# Patient Record
Sex: FEMALE | Race: WHITE | Employment: UNEMPLOYED | URBAN - NONMETROPOLITAN AREA
[De-identification: names, ages, dates, MRNs, and addresses within clinical notes are randomized per-mention and may not be internally consistent; named-entity substitution may affect disease eponyms.]

---

## 2017-03-18 LAB
BASOPHILS ABSOLUTE: NORMAL /ΜL
BASOPHILS RELATIVE PERCENT: NORMAL %
BUN BLDV-MCNC: 39 MG/DL
CALCIUM SERPL-MCNC: 10.3 MG/DL
CHLORIDE BLD-SCNC: 105 MMOL/L
CO2: 28 MMOL/L
CREAT SERPL-MCNC: 1.5 MG/DL
EOSINOPHILS ABSOLUTE: NORMAL /ΜL
EOSINOPHILS RELATIVE PERCENT: NORMAL %
GFR CALCULATED: 37
GLUCOSE BLD-MCNC: 145 MG/DL
HCT VFR BLD CALC: 44 % (ref 36–46)
HEMOGLOBIN: 14.2 G/DL (ref 12–16)
LYMPHOCYTES ABSOLUTE: NORMAL /ΜL
LYMPHOCYTES RELATIVE PERCENT: NORMAL %
MCH RBC QN AUTO: NORMAL PG
MCHC RBC AUTO-ENTMCNC: NORMAL G/DL
MCV RBC AUTO: NORMAL FL
MONOCYTES ABSOLUTE: NORMAL /ΜL
MONOCYTES RELATIVE PERCENT: NORMAL %
NEUTROPHILS ABSOLUTE: NORMAL /ΜL
NEUTROPHILS RELATIVE PERCENT: NORMAL %
PLATELET # BLD: 287 K/ΜL
PMV BLD AUTO: NORMAL FL
POTASSIUM SERPL-SCNC: 4.4 MMOL/L
RBC # BLD: 4.8 10^6/ΜL
SODIUM BLD-SCNC: 141 MMOL/L
WBC # BLD: 6.18 10^3/ML

## 2017-03-28 ENCOUNTER — OFFICE VISIT (OUTPATIENT)
Dept: NEPHROLOGY | Age: 67
End: 2017-03-28

## 2017-03-28 VITALS
HEART RATE: 77 BPM | HEIGHT: 62 IN | DIASTOLIC BLOOD PRESSURE: 72 MMHG | SYSTOLIC BLOOD PRESSURE: 108 MMHG | WEIGHT: 191 LBS | OXYGEN SATURATION: 94 % | BODY MASS INDEX: 35.15 KG/M2 | RESPIRATION RATE: 18 BRPM

## 2017-03-28 DIAGNOSIS — N18.30 CKD (CHRONIC KIDNEY DISEASE) STAGE 3, GFR 30-59 ML/MIN (HCC): Primary | ICD-10-CM

## 2017-03-28 PROCEDURE — G8400 PT W/DXA NO RESULTS DOC: HCPCS | Performed by: INTERNAL MEDICINE

## 2017-03-28 PROCEDURE — 99213 OFFICE O/P EST LOW 20 MIN: CPT | Performed by: INTERNAL MEDICINE

## 2017-03-28 PROCEDURE — 1036F TOBACCO NON-USER: CPT | Performed by: INTERNAL MEDICINE

## 2017-03-28 PROCEDURE — 1090F PRES/ABSN URINE INCON ASSESS: CPT | Performed by: INTERNAL MEDICINE

## 2017-03-28 PROCEDURE — G8427 DOCREV CUR MEDS BY ELIG CLIN: HCPCS | Performed by: INTERNAL MEDICINE

## 2017-03-28 PROCEDURE — 4040F PNEUMOC VAC/ADMIN/RCVD: CPT | Performed by: INTERNAL MEDICINE

## 2017-03-28 PROCEDURE — G8484 FLU IMMUNIZE NO ADMIN: HCPCS | Performed by: INTERNAL MEDICINE

## 2017-03-28 PROCEDURE — G8598 ASA/ANTIPLAT THER USED: HCPCS | Performed by: INTERNAL MEDICINE

## 2017-03-28 PROCEDURE — G8419 CALC BMI OUT NRM PARAM NOF/U: HCPCS | Performed by: INTERNAL MEDICINE

## 2017-03-28 PROCEDURE — 3017F COLORECTAL CA SCREEN DOC REV: CPT | Performed by: INTERNAL MEDICINE

## 2017-03-28 PROCEDURE — 3014F SCREEN MAMMO DOC REV: CPT | Performed by: INTERNAL MEDICINE

## 2017-03-28 PROCEDURE — 1123F ACP DISCUSS/DSCN MKR DOCD: CPT | Performed by: INTERNAL MEDICINE

## 2017-09-20 LAB
BASOPHILS ABSOLUTE: NORMAL /ΜL
BASOPHILS RELATIVE PERCENT: NORMAL %
BUN BLDV-MCNC: 43 MG/DL
CALCIUM SERPL-MCNC: 9.6 MG/DL
CHLORIDE BLD-SCNC: 104 MMOL/L
CO2: 28 MMOL/L
CREAT SERPL-MCNC: 1.6 MG/DL
EOSINOPHILS ABSOLUTE: NORMAL /ΜL
EOSINOPHILS RELATIVE PERCENT: NORMAL %
GFR CALCULATED: 34
GLUCOSE BLD-MCNC: 231 MG/DL
HCT VFR BLD CALC: 42.7 % (ref 36–46)
HEMOGLOBIN: 14.1 G/DL (ref 12–16)
LYMPHOCYTES ABSOLUTE: NORMAL /ΜL
LYMPHOCYTES RELATIVE PERCENT: NORMAL %
MCH RBC QN AUTO: NORMAL PG
MCHC RBC AUTO-ENTMCNC: NORMAL G/DL
MCV RBC AUTO: NORMAL FL
MONOCYTES ABSOLUTE: NORMAL /ΜL
MONOCYTES RELATIVE PERCENT: NORMAL %
NEUTROPHILS ABSOLUTE: NORMAL /ΜL
NEUTROPHILS RELATIVE PERCENT: NORMAL %
PDW BLD-RTO: NORMAL %
PLATELET # BLD: 279 K/ΜL
PMV BLD AUTO: NORMAL FL
POTASSIUM SERPL-SCNC: 4.5 MMOL/L
RBC # BLD: 4.7 10^6/ΜL
SODIUM BLD-SCNC: 141 MMOL/L
WBC # BLD: 5.28 10^3/ML

## 2017-09-26 ENCOUNTER — OFFICE VISIT (OUTPATIENT)
Dept: NEPHROLOGY | Age: 67
End: 2017-09-26
Payer: MEDICARE

## 2017-09-26 VITALS
WEIGHT: 193 LBS | BODY MASS INDEX: 35.51 KG/M2 | DIASTOLIC BLOOD PRESSURE: 76 MMHG | OXYGEN SATURATION: 96 % | SYSTOLIC BLOOD PRESSURE: 128 MMHG | RESPIRATION RATE: 16 BRPM | HEART RATE: 79 BPM | HEIGHT: 62 IN

## 2017-09-26 DIAGNOSIS — N18.30 CKD (CHRONIC KIDNEY DISEASE), STAGE III (HCC): Primary | ICD-10-CM

## 2017-09-26 PROCEDURE — 1036F TOBACCO NON-USER: CPT | Performed by: INTERNAL MEDICINE

## 2017-09-26 PROCEDURE — G8400 PT W/DXA NO RESULTS DOC: HCPCS | Performed by: INTERNAL MEDICINE

## 2017-09-26 PROCEDURE — 99213 OFFICE O/P EST LOW 20 MIN: CPT | Performed by: INTERNAL MEDICINE

## 2017-09-26 PROCEDURE — 4040F PNEUMOC VAC/ADMIN/RCVD: CPT | Performed by: INTERNAL MEDICINE

## 2017-09-26 PROCEDURE — 3017F COLORECTAL CA SCREEN DOC REV: CPT | Performed by: INTERNAL MEDICINE

## 2017-09-26 PROCEDURE — 1090F PRES/ABSN URINE INCON ASSESS: CPT | Performed by: INTERNAL MEDICINE

## 2017-09-26 PROCEDURE — G8427 DOCREV CUR MEDS BY ELIG CLIN: HCPCS | Performed by: INTERNAL MEDICINE

## 2017-09-26 PROCEDURE — 3014F SCREEN MAMMO DOC REV: CPT | Performed by: INTERNAL MEDICINE

## 2017-09-26 PROCEDURE — 1123F ACP DISCUSS/DSCN MKR DOCD: CPT | Performed by: INTERNAL MEDICINE

## 2017-09-26 PROCEDURE — G8598 ASA/ANTIPLAT THER USED: HCPCS | Performed by: INTERNAL MEDICINE

## 2017-09-26 PROCEDURE — G8419 CALC BMI OUT NRM PARAM NOF/U: HCPCS | Performed by: INTERNAL MEDICINE

## 2017-09-26 RX ORDER — TRAZODONE HYDROCHLORIDE 50 MG/1
50 TABLET ORAL PRN
COMMUNITY
Start: 2017-07-10 | End: 2022-01-14

## 2018-01-19 LAB
BASOPHILS ABSOLUTE: NORMAL /ΜL
BASOPHILS RELATIVE PERCENT: NORMAL %
BUN BLDV-MCNC: 45 MG/DL
CALCIUM SERPL-MCNC: 9.8 MG/DL
CHLORIDE BLD-SCNC: 101 MMOL/L
CO2: 26 MMOL/L
CREAT SERPL-MCNC: 1.5 MG/DL
EOSINOPHILS ABSOLUTE: NORMAL /ΜL
EOSINOPHILS RELATIVE PERCENT: NORMAL %
GFR CALCULATED: 37
GLUCOSE BLD-MCNC: 153 MG/DL
HCT VFR BLD CALC: 43.4 % (ref 36–46)
HEMOGLOBIN: 14 G/DL (ref 12–16)
LYMPHOCYTES ABSOLUTE: NORMAL /ΜL
LYMPHOCYTES RELATIVE PERCENT: NORMAL %
MCH RBC QN AUTO: NORMAL PG
MCHC RBC AUTO-ENTMCNC: NORMAL G/DL
MCV RBC AUTO: NORMAL FL
MONOCYTES ABSOLUTE: NORMAL /ΜL
MONOCYTES RELATIVE PERCENT: NORMAL %
NEUTROPHILS ABSOLUTE: NORMAL /ΜL
NEUTROPHILS RELATIVE PERCENT: NORMAL %
PLATELET # BLD: 326 K/ΜL
PMV BLD AUTO: NORMAL FL
POTASSIUM SERPL-SCNC: 4.6 MMOL/L
RBC # BLD: 4.87 10^6/ΜL
SODIUM BLD-SCNC: 137 MMOL/L
WBC # BLD: 4.65 10^3/ML

## 2018-01-23 ENCOUNTER — OFFICE VISIT (OUTPATIENT)
Dept: NEPHROLOGY | Age: 68
End: 2018-01-23
Payer: MEDICARE

## 2018-01-23 VITALS
WEIGHT: 190 LBS | BODY MASS INDEX: 34.96 KG/M2 | OXYGEN SATURATION: 92 % | HEIGHT: 62 IN | DIASTOLIC BLOOD PRESSURE: 78 MMHG | RESPIRATION RATE: 18 BRPM | SYSTOLIC BLOOD PRESSURE: 130 MMHG | HEART RATE: 82 BPM

## 2018-01-23 DIAGNOSIS — N18.30 CKD (CHRONIC KIDNEY DISEASE), STAGE III (HCC): Primary | ICD-10-CM

## 2018-01-23 PROCEDURE — 3014F SCREEN MAMMO DOC REV: CPT | Performed by: INTERNAL MEDICINE

## 2018-01-23 PROCEDURE — 3017F COLORECTAL CA SCREEN DOC REV: CPT | Performed by: INTERNAL MEDICINE

## 2018-01-23 PROCEDURE — G8427 DOCREV CUR MEDS BY ELIG CLIN: HCPCS | Performed by: INTERNAL MEDICINE

## 2018-01-23 PROCEDURE — 1090F PRES/ABSN URINE INCON ASSESS: CPT | Performed by: INTERNAL MEDICINE

## 2018-01-23 PROCEDURE — G8417 CALC BMI ABV UP PARAM F/U: HCPCS | Performed by: INTERNAL MEDICINE

## 2018-01-23 PROCEDURE — 99213 OFFICE O/P EST LOW 20 MIN: CPT | Performed by: INTERNAL MEDICINE

## 2018-01-23 PROCEDURE — G8484 FLU IMMUNIZE NO ADMIN: HCPCS | Performed by: INTERNAL MEDICINE

## 2018-01-23 PROCEDURE — 1123F ACP DISCUSS/DSCN MKR DOCD: CPT | Performed by: INTERNAL MEDICINE

## 2018-01-23 PROCEDURE — G8598 ASA/ANTIPLAT THER USED: HCPCS | Performed by: INTERNAL MEDICINE

## 2018-01-23 PROCEDURE — 4040F PNEUMOC VAC/ADMIN/RCVD: CPT | Performed by: INTERNAL MEDICINE

## 2018-01-23 PROCEDURE — 1036F TOBACCO NON-USER: CPT | Performed by: INTERNAL MEDICINE

## 2018-01-23 PROCEDURE — G8400 PT W/DXA NO RESULTS DOC: HCPCS | Performed by: INTERNAL MEDICINE

## 2018-01-23 RX ORDER — FLUOXETINE HYDROCHLORIDE 20 MG/1
20 CAPSULE ORAL DAILY
COMMUNITY
Start: 2017-12-29 | End: 2018-05-22 | Stop reason: ALTCHOICE

## 2018-01-23 NOTE — PROGRESS NOTES
Take 1 tablet by mouth daily.  ranitidine (ZANTAC) 150 MG capsule Take 150 mg by mouth 2 times daily.  levothyroxine (SYNTHROID) 50 MCG tablet Take 50 mcg by mouth Daily.  aspirin 81 MG tablet Take 81 mg by mouth daily.  busPIRone (BUSPAR) 10 MG tablet 10 mg daily       No current facility-administered medications for this visit. IMPRESSIONS:      1. Chronic kidney disease stage IIIB from DM with improved kidney function. PLAN:  1. We discussed the eGFR today. 2. We will continue all current medications without changes. 3. We will see the patient back in 4 months.               _________________________________  Ralph Eduardo.  Jealyn Gomez DO  [facility]      CC  Yogesh Castillo NP

## 2018-05-12 LAB
ALBUMIN SERPL-MCNC: 4 G/DL
ALP BLD-CCNC: 67 U/L
ALT SERPL-CCNC: 30 U/L
ANION GAP SERPL CALCULATED.3IONS-SCNC: 15 MMOL/L
AST SERPL-CCNC: 19 U/L
BASOPHILS ABSOLUTE: NORMAL /ΜL
BASOPHILS RELATIVE PERCENT: NORMAL %
BILIRUB SERPL-MCNC: 0.2 MG/DL (ref 0.1–1.4)
BUN BLDV-MCNC: 52 MG/DL
CALCIUM SERPL-MCNC: 9.6 MG/DL
CHLORIDE BLD-SCNC: 103 MMOL/L
CHOLESTEROL, TOTAL: 215 MG/DL
CHOLESTEROL/HDL RATIO: ABNORMAL
CO2: 26 MMOL/L
CREAT SERPL-MCNC: 1.5 MG/DL
EOSINOPHILS ABSOLUTE: NORMAL /ΜL
EOSINOPHILS RELATIVE PERCENT: NORMAL %
GFR CALCULATED: 37
GLUCOSE BLD-MCNC: 171 MG/DL
HCT VFR BLD CALC: 44.4 % (ref 36–46)
HDLC SERPL-MCNC: 23 MG/DL (ref 35–70)
HEMOGLOBIN: 14.6 G/DL (ref 12–16)
LDL CHOLESTEROL CALCULATED: 114 MG/DL (ref 0–160)
LYMPHOCYTES ABSOLUTE: NORMAL /ΜL
LYMPHOCYTES RELATIVE PERCENT: NORMAL %
MAGNESIUM: 1.7 MG/DL
MCH RBC QN AUTO: NORMAL PG
MCHC RBC AUTO-ENTMCNC: NORMAL G/DL
MCV RBC AUTO: NORMAL FL
MONOCYTES ABSOLUTE: NORMAL /ΜL
MONOCYTES RELATIVE PERCENT: NORMAL %
NEUTROPHILS ABSOLUTE: NORMAL /ΜL
NEUTROPHILS RELATIVE PERCENT: NORMAL %
PLATELET # BLD: 285 K/ΜL
PMV BLD AUTO: NORMAL FL
POTASSIUM SERPL-SCNC: 4.4 MMOL/L
RBC # BLD: 4.95 10^6/ΜL
SODIUM BLD-SCNC: 140 MMOL/L
TOTAL PROTEIN: 8
TRIGL SERPL-MCNC: 392 MG/DL
VLDLC SERPL CALC-MCNC: 78 MG/DL
WBC # BLD: 5.24 10^3/ML

## 2018-05-22 ENCOUNTER — OFFICE VISIT (OUTPATIENT)
Dept: NEPHROLOGY | Age: 68
End: 2018-05-22
Payer: MEDICARE

## 2018-05-22 VITALS
SYSTOLIC BLOOD PRESSURE: 122 MMHG | HEIGHT: 62 IN | BODY MASS INDEX: 35.88 KG/M2 | HEART RATE: 78 BPM | DIASTOLIC BLOOD PRESSURE: 82 MMHG | RESPIRATION RATE: 18 BRPM | WEIGHT: 195 LBS | OXYGEN SATURATION: 95 %

## 2018-05-22 DIAGNOSIS — N18.30 CKD (CHRONIC KIDNEY DISEASE), STAGE III (HCC): Primary | ICD-10-CM

## 2018-05-22 PROCEDURE — 1123F ACP DISCUSS/DSCN MKR DOCD: CPT | Performed by: INTERNAL MEDICINE

## 2018-05-22 PROCEDURE — G8400 PT W/DXA NO RESULTS DOC: HCPCS | Performed by: INTERNAL MEDICINE

## 2018-05-22 PROCEDURE — G8427 DOCREV CUR MEDS BY ELIG CLIN: HCPCS | Performed by: INTERNAL MEDICINE

## 2018-05-22 PROCEDURE — G8598 ASA/ANTIPLAT THER USED: HCPCS | Performed by: INTERNAL MEDICINE

## 2018-05-22 PROCEDURE — 3017F COLORECTAL CA SCREEN DOC REV: CPT | Performed by: INTERNAL MEDICINE

## 2018-05-22 PROCEDURE — 99213 OFFICE O/P EST LOW 20 MIN: CPT | Performed by: INTERNAL MEDICINE

## 2018-05-22 PROCEDURE — 1036F TOBACCO NON-USER: CPT | Performed by: INTERNAL MEDICINE

## 2018-05-22 PROCEDURE — 1090F PRES/ABSN URINE INCON ASSESS: CPT | Performed by: INTERNAL MEDICINE

## 2018-05-22 PROCEDURE — G8417 CALC BMI ABV UP PARAM F/U: HCPCS | Performed by: INTERNAL MEDICINE

## 2018-05-22 PROCEDURE — 4040F PNEUMOC VAC/ADMIN/RCVD: CPT | Performed by: INTERNAL MEDICINE

## 2019-01-08 LAB
ALBUMIN SERPL-MCNC: 3.7 G/DL
ALP BLD-CCNC: 59 U/L
ALT SERPL-CCNC: 17 U/L
ANION GAP SERPL CALCULATED.3IONS-SCNC: 15 MMOL/L
AST SERPL-CCNC: 27 U/L
AVERAGE GLUCOSE: NORMAL
BASOPHILS ABSOLUTE: ABNORMAL /ΜL
BASOPHILS RELATIVE PERCENT: ABNORMAL %
BILIRUB SERPL-MCNC: 0.3 MG/DL (ref 0.1–1.4)
BUN BLDV-MCNC: 34 MG/DL
CALCIUM SERPL-MCNC: 8.8 MG/DL
CHLORIDE BLD-SCNC: 101 MMOL/L
CHOLESTEROL, TOTAL: 201 MG/DL
CHOLESTEROL/HDL RATIO: ABNORMAL
CO2: 25 MMOL/L
CREAT SERPL-MCNC: 1.4 MG/DL
CREATININE, URINE: 87.8
EOSINOPHILS ABSOLUTE: ABNORMAL /ΜL
EOSINOPHILS RELATIVE PERCENT: ABNORMAL %
GFR CALCULATED: 40
GLUCOSE BLD-MCNC: 222 MG/DL
HBA1C MFR BLD: 7.7 %
HCT VFR BLD CALC: 43.7 % (ref 36–46)
HDLC SERPL-MCNC: 18 MG/DL (ref 35–70)
HEMOGLOBIN: 14.4 G/DL (ref 12–16)
LDL CHOLESTEROL CALCULATED: 41 MG/DL (ref 0–160)
LYMPHOCYTES ABSOLUTE: ABNORMAL /ΜL
LYMPHOCYTES RELATIVE PERCENT: ABNORMAL %
MCH RBC QN AUTO: ABNORMAL PG
MCHC RBC AUTO-ENTMCNC: ABNORMAL G/DL
MCV RBC AUTO: ABNORMAL FL
MICROALBUMIN/CREAT 24H UR: 274.9 MG/G{CREAT}
MICROALBUMIN/CREAT UR-RTO: 313.1
MONOCYTES ABSOLUTE: ABNORMAL /ΜL
MONOCYTES RELATIVE PERCENT: ABNORMAL %
NEUTROPHILS ABSOLUTE: ABNORMAL /ΜL
NEUTROPHILS RELATIVE PERCENT: ABNORMAL %
PLATELET # BLD: 286 K/ΜL
PMV BLD AUTO: ABNORMAL FL
POTASSIUM SERPL-SCNC: 4.2 MMOL/L
RBC # BLD: 4.77 10^6/ΜL
SODIUM BLD-SCNC: 137 MMOL/L
TOTAL PROTEIN: 7.5
TRIGL SERPL-MCNC: 711 MG/DL
VLDLC SERPL CALC-MCNC: 142 MG/DL
WBC # BLD: 4.22 10^3/ML

## 2019-01-18 ENCOUNTER — OFFICE VISIT (OUTPATIENT)
Dept: NEPHROLOGY | Age: 69
End: 2019-01-18
Payer: MEDICARE

## 2019-01-18 VITALS
OXYGEN SATURATION: 98 % | RESPIRATION RATE: 18 BRPM | BODY MASS INDEX: 37.36 KG/M2 | SYSTOLIC BLOOD PRESSURE: 112 MMHG | HEART RATE: 75 BPM | WEIGHT: 203 LBS | HEIGHT: 62 IN | DIASTOLIC BLOOD PRESSURE: 64 MMHG

## 2019-01-18 DIAGNOSIS — N18.30 CKD (CHRONIC KIDNEY DISEASE), STAGE III (HCC): Primary | ICD-10-CM

## 2019-01-18 PROCEDURE — G8417 CALC BMI ABV UP PARAM F/U: HCPCS | Performed by: INTERNAL MEDICINE

## 2019-01-18 PROCEDURE — 4040F PNEUMOC VAC/ADMIN/RCVD: CPT | Performed by: INTERNAL MEDICINE

## 2019-01-18 PROCEDURE — 1123F ACP DISCUSS/DSCN MKR DOCD: CPT | Performed by: INTERNAL MEDICINE

## 2019-01-18 PROCEDURE — G8427 DOCREV CUR MEDS BY ELIG CLIN: HCPCS | Performed by: INTERNAL MEDICINE

## 2019-01-18 PROCEDURE — G8598 ASA/ANTIPLAT THER USED: HCPCS | Performed by: INTERNAL MEDICINE

## 2019-01-18 PROCEDURE — G8400 PT W/DXA NO RESULTS DOC: HCPCS | Performed by: INTERNAL MEDICINE

## 2019-01-18 PROCEDURE — G8484 FLU IMMUNIZE NO ADMIN: HCPCS | Performed by: INTERNAL MEDICINE

## 2019-01-18 PROCEDURE — 99213 OFFICE O/P EST LOW 20 MIN: CPT | Performed by: INTERNAL MEDICINE

## 2019-01-18 PROCEDURE — 1101F PT FALLS ASSESS-DOCD LE1/YR: CPT | Performed by: INTERNAL MEDICINE

## 2019-01-18 PROCEDURE — 1036F TOBACCO NON-USER: CPT | Performed by: INTERNAL MEDICINE

## 2019-01-18 PROCEDURE — 1090F PRES/ABSN URINE INCON ASSESS: CPT | Performed by: INTERNAL MEDICINE

## 2019-01-18 PROCEDURE — 3017F COLORECTAL CA SCREEN DOC REV: CPT | Performed by: INTERNAL MEDICINE

## 2019-01-18 RX ORDER — IRBESARTAN 150 MG/1
150 TABLET ORAL DAILY
COMMUNITY
Start: 2019-01-10

## 2019-01-18 RX ORDER — PAROXETINE HYDROCHLORIDE 20 MG/1
20 TABLET, FILM COATED ORAL DAILY
COMMUNITY
Start: 2019-01-10 | End: 2022-04-29

## 2019-01-18 RX ORDER — HUMAN INSULIN 100 [IU]/ML
INJECTION, SOLUTION SUBCUTANEOUS
COMMUNITY
Start: 2019-01-10 | End: 2019-09-06 | Stop reason: ALTCHOICE

## 2019-09-03 LAB
BASOPHILS ABSOLUTE: NORMAL /ΜL
BASOPHILS RELATIVE PERCENT: NORMAL %
BUN BLDV-MCNC: 24 MG/DL
CALCIUM SERPL-MCNC: 9.4 MG/DL
CHLORIDE BLD-SCNC: 101 MMOL/L
CO2: 29 MMOL/L
CREAT SERPL-MCNC: 1.2 MG/DL
EOSINOPHILS ABSOLUTE: NORMAL /ΜL
EOSINOPHILS RELATIVE PERCENT: NORMAL %
GFR CALCULATED: 48
GLUCOSE BLD-MCNC: 123 MG/DL
HCT VFR BLD CALC: 45.5 % (ref 36–46)
HEMOGLOBIN: 15.3 G/DL (ref 12–16)
LYMPHOCYTES ABSOLUTE: NORMAL /ΜL
LYMPHOCYTES RELATIVE PERCENT: NORMAL %
MCH RBC QN AUTO: NORMAL PG
MCHC RBC AUTO-ENTMCNC: NORMAL G/DL
MCV RBC AUTO: NORMAL FL
MONOCYTES ABSOLUTE: NORMAL /ΜL
MONOCYTES RELATIVE PERCENT: NORMAL %
NEUTROPHILS ABSOLUTE: NORMAL /ΜL
NEUTROPHILS RELATIVE PERCENT: NORMAL %
PLATELET # BLD: 294 K/ΜL
PMV BLD AUTO: NORMAL FL
POTASSIUM SERPL-SCNC: 3.9 MMOL/L
RBC # BLD: 5.2 10^6/ΜL
SODIUM BLD-SCNC: 139 MMOL/L
WBC # BLD: 5.21 10^3/ML

## 2019-09-06 ENCOUNTER — OFFICE VISIT (OUTPATIENT)
Dept: NEPHROLOGY | Age: 69
End: 2019-09-06
Payer: MEDICARE

## 2019-09-06 VITALS
DIASTOLIC BLOOD PRESSURE: 68 MMHG | BODY MASS INDEX: 37.57 KG/M2 | WEIGHT: 199 LBS | RESPIRATION RATE: 18 BRPM | HEIGHT: 61 IN | OXYGEN SATURATION: 94 % | HEART RATE: 75 BPM | SYSTOLIC BLOOD PRESSURE: 118 MMHG

## 2019-09-06 DIAGNOSIS — N18.30 CKD (CHRONIC KIDNEY DISEASE), STAGE III (HCC): Primary | ICD-10-CM

## 2019-09-06 PROCEDURE — 3017F COLORECTAL CA SCREEN DOC REV: CPT | Performed by: INTERNAL MEDICINE

## 2019-09-06 PROCEDURE — 4040F PNEUMOC VAC/ADMIN/RCVD: CPT | Performed by: INTERNAL MEDICINE

## 2019-09-06 PROCEDURE — 99213 OFFICE O/P EST LOW 20 MIN: CPT | Performed by: INTERNAL MEDICINE

## 2019-09-06 PROCEDURE — 1090F PRES/ABSN URINE INCON ASSESS: CPT | Performed by: INTERNAL MEDICINE

## 2019-09-06 PROCEDURE — G8427 DOCREV CUR MEDS BY ELIG CLIN: HCPCS | Performed by: INTERNAL MEDICINE

## 2019-09-06 PROCEDURE — G8598 ASA/ANTIPLAT THER USED: HCPCS | Performed by: INTERNAL MEDICINE

## 2019-09-06 PROCEDURE — G8417 CALC BMI ABV UP PARAM F/U: HCPCS | Performed by: INTERNAL MEDICINE

## 2019-09-06 PROCEDURE — 1123F ACP DISCUSS/DSCN MKR DOCD: CPT | Performed by: INTERNAL MEDICINE

## 2019-09-06 PROCEDURE — G8400 PT W/DXA NO RESULTS DOC: HCPCS | Performed by: INTERNAL MEDICINE

## 2019-09-06 PROCEDURE — 1036F TOBACCO NON-USER: CPT | Performed by: INTERNAL MEDICINE

## 2020-03-09 LAB
BASOPHILS ABSOLUTE: NORMAL
BASOPHILS ABSOLUTE: NORMAL
BASOPHILS RELATIVE PERCENT: NORMAL
BASOPHILS RELATIVE PERCENT: NORMAL
BUN BLDV-MCNC: 32 MG/DL
CALCIUM SERPL-MCNC: 9.3 MG/DL
CHLORIDE BLD-SCNC: 102 MMOL/L
CHOLESTEROL, TOTAL: 218 MG/DL
CHOLESTEROL/HDL RATIO: ABNORMAL
CO2: 29 MMOL/L
CREAT SERPL-MCNC: 1.5 MG/DL
EOSINOPHILS ABSOLUTE: NORMAL
EOSINOPHILS ABSOLUTE: NORMAL
EOSINOPHILS RELATIVE PERCENT: NORMAL
EOSINOPHILS RELATIVE PERCENT: NORMAL
GFR CALCULATED: 53
GLUCOSE BLD-MCNC: 314 MG/DL
HCT VFR BLD CALC: 43.5 % (ref 36–46)
HCT VFR BLD CALC: 43.5 % (ref 36–46)
HDLC SERPL-MCNC: 29 MG/DL (ref 35–70)
HEMOGLOBIN: 14.5 G/DL (ref 12–16)
HEMOGLOBIN: 14.5 G/DL (ref 12–16)
LDL CHOLESTEROL CALCULATED: 137 MG/DL (ref 0–160)
LYMPHOCYTES ABSOLUTE: NORMAL
LYMPHOCYTES ABSOLUTE: NORMAL
LYMPHOCYTES RELATIVE PERCENT: NORMAL
LYMPHOCYTES RELATIVE PERCENT: NORMAL
MCH RBC QN AUTO: NORMAL PG
MCH RBC QN AUTO: NORMAL PG
MCHC RBC AUTO-ENTMCNC: NORMAL G/DL
MCHC RBC AUTO-ENTMCNC: NORMAL G/DL
MCV RBC AUTO: NORMAL FL
MCV RBC AUTO: NORMAL FL
MONOCYTES ABSOLUTE: NORMAL
MONOCYTES ABSOLUTE: NORMAL
MONOCYTES RELATIVE PERCENT: NORMAL
MONOCYTES RELATIVE PERCENT: NORMAL
NEUTROPHILS ABSOLUTE: NORMAL
NEUTROPHILS ABSOLUTE: NORMAL
NEUTROPHILS RELATIVE PERCENT: NORMAL
NEUTROPHILS RELATIVE PERCENT: NORMAL
PLATELET # BLD: 343 K/ΜL
PLATELET # BLD: 343 K/ΜL
PMV BLD AUTO: NORMAL FL
PMV BLD AUTO: NORMAL FL
POTASSIUM SERPL-SCNC: 4.5 MMOL/L
RBC # BLD: 4.9 10^6/ΜL
RBC # BLD: 4.9 10^6/ΜL
SODIUM BLD-SCNC: 137 MMOL/L
TRIGL SERPL-MCNC: 261 MG/DL
VLDLC SERPL CALC-MCNC: 52 MG/DL
WBC # BLD: 6.39 10^3/ML
WBC # BLD: 6.39 10^3/ML

## 2020-03-13 ENCOUNTER — OFFICE VISIT (OUTPATIENT)
Dept: NEPHROLOGY | Age: 70
End: 2020-03-13
Payer: MEDICARE

## 2020-03-13 VITALS
OXYGEN SATURATION: 97 % | HEIGHT: 61 IN | HEART RATE: 66 BPM | WEIGHT: 195 LBS | DIASTOLIC BLOOD PRESSURE: 64 MMHG | SYSTOLIC BLOOD PRESSURE: 108 MMHG | BODY MASS INDEX: 36.82 KG/M2

## 2020-03-13 PROCEDURE — 1123F ACP DISCUSS/DSCN MKR DOCD: CPT | Performed by: INTERNAL MEDICINE

## 2020-03-13 PROCEDURE — 1090F PRES/ABSN URINE INCON ASSESS: CPT | Performed by: INTERNAL MEDICINE

## 2020-03-13 PROCEDURE — G8417 CALC BMI ABV UP PARAM F/U: HCPCS | Performed by: INTERNAL MEDICINE

## 2020-03-13 PROCEDURE — G8427 DOCREV CUR MEDS BY ELIG CLIN: HCPCS | Performed by: INTERNAL MEDICINE

## 2020-03-13 PROCEDURE — 1036F TOBACCO NON-USER: CPT | Performed by: INTERNAL MEDICINE

## 2020-03-13 PROCEDURE — 99213 OFFICE O/P EST LOW 20 MIN: CPT | Performed by: INTERNAL MEDICINE

## 2020-03-13 PROCEDURE — 4040F PNEUMOC VAC/ADMIN/RCVD: CPT | Performed by: INTERNAL MEDICINE

## 2020-03-13 PROCEDURE — 3017F COLORECTAL CA SCREEN DOC REV: CPT | Performed by: INTERNAL MEDICINE

## 2020-03-13 PROCEDURE — G8482 FLU IMMUNIZE ORDER/ADMIN: HCPCS | Performed by: INTERNAL MEDICINE

## 2020-03-13 PROCEDURE — G8400 PT W/DXA NO RESULTS DOC: HCPCS | Performed by: INTERNAL MEDICINE

## 2020-03-13 NOTE — PROGRESS NOTES
strength and tone are equal throughout. Skin:                Warm and dry with no rashes. Muscles:         Hand  and leg strength are equal and strong bilaterally.      Lab Data      CBC:   Lab Results   Component Value Date    WBC 6.39 03/09/2020    WBC 6.39 03/09/2020    HGB 14.5 03/09/2020    HGB 14.5 03/09/2020    HCT 43.5 03/09/2020    HCT 43.5 03/09/2020     03/09/2020     03/09/2020     BMP:    Lab Results   Component Value Date     03/09/2020     09/03/2019     01/08/2019    K 4.5 03/09/2020    K 3.9 09/03/2019    K 4.2 01/08/2019     03/09/2020     09/03/2019     01/08/2019    CO2 29 03/09/2020    CO2 29 09/03/2019    CO2 25 01/08/2019    BUN 32 03/09/2020    BUN 24 09/03/2019    BUN 34 01/08/2019    CREATININE 1.5 03/09/2020    CREATININE 1.2 09/03/2019    CREATININE 1.4 01/08/2019    GLUCOSE 314 03/09/2020    GLUCOSE 123 09/03/2019    GLUCOSE 222 01/08/2019      Hepatic:   Lab Results   Component Value Date    AST 27 01/08/2019    AST 19 05/12/2018    AST 15 12/06/2014    ALT 17 01/08/2019    ALT 30 05/12/2018    ALT 24 12/06/2014    BILITOT 0.3 01/08/2019    BILITOT 0.2 05/12/2018    BILITOT 0.3 12/06/2014    ALKPHOS 59 01/08/2019    ALKPHOS 67 05/12/2018    ALKPHOS 79 12/06/2014     BNP: No results found for: BNP  Lipids:   Lab Results   Component Value Date    CHOL 218 03/09/2020    HDL 29 (A) 03/09/2020     INR: No results found for: INR  URINE: No results found for: NAUR, PROTUR  No results found for: NITRU, COLORU, PHUR, LABCAST, WBCUA, RBCUA, MUCUS, TRICHOMONAS, YEAST, BACTERIA, CLARITYU, SPECGRAV, LEUKOCYTESUR, UROBILINOGEN, BILIRUBINUR, BLOODU, GLUCOSEU, KETUA, AMORPHOUS   Microalbumen/Creatinine ratio:  No components found for: RUCREAT        Medications:    Current Outpatient Medications   Medication Sig Dispense Refill    PARoxetine (PAXIL) 20 MG tablet 20 mg daily      irbesartan (AVAPRO) 150 MG tablet 150 mg daily      traZODone (DESYREL) 50 MG tablet 50 mg as needed      atorvastatin (LIPITOR) 80 MG tablet 80 mg every evening      valsartan-hydrochlorothiazide (DIOVAN-HCT) 320-25 MG per tablet       atenolol (TENORMIN) 25 MG tablet 25 mg daily      amLODIPine (NORVASC) 10 MG tablet 10 mg daily      busPIRone (BUSPAR) 10 MG tablet 10 mg daily      fenofibrate 160 MG tablet 160 mg daily      gabapentin (NEURONTIN) 100 MG capsule 100 mg 3 times daily      INSULIN REGULAR HUMAN IJ Inject 10 Units as directed Plus sliding scale      insulin NPH (HUMULIN N;NOVOLIN N) 100 UNIT/ML injection vial Inject 50 Units into the skin nightly      Multiple Vitamins-Minerals (THERAPEUTIC MULTIVITAMIN-MINERALS) tablet Take 1 tablet by mouth daily.  ranitidine (ZANTAC) 150 MG capsule Take 150 mg by mouth 2 times daily.  levothyroxine (SYNTHROID) 50 MCG tablet Take 50 mcg by mouth Daily.  aspirin 81 MG tablet Take 81 mg by mouth daily. No current facility-administered medications for this visit. IMPRESSIONS:      Kidney disease: CKd stage G-IIIA-A1 with improved function  Anemia: Anemia remains stable. No need for an erythrocyte stimulating agent (VICTORIANO). Bone and mineral metabolism: stable       PLAN:  1. We discussed the eGFR today. 2. We will continue all current medications without changes. 3. We will see the patient back in 6 months.               _________________________________  Patricia Ruff.  Gogo Leach,   [facility]      CC  Taj oCnnell, APRN - CNP

## 2020-03-13 NOTE — PATIENT INSTRUCTIONS
KNOW YOUR KIDNEY NUMBERS    Your kidney speed (eGFR) was 53 ml/min this visit (normal is  ml/min)(Ml/min=milliliters of blood filtered per minute). The higher this number is, the better your kidney function is. Your serum creatinine was 1.5 (normal 0.8-1.2 mg/dl at most labs). The higher this number is, the worse your kidney function is. You are in stage G-IIIA-A1 of chronic kidney disease. Your kidney function has improved as compared to your last visit. Stages of kidney disease    EGFR (estimated glomerular filtration rate)    G-I > 90 ml/min  G-II 60-89 ml/min  G-IIIA 45-59 ml/min  G-IIIB 30-44 ml/min  G-IV 15-29 ml/min  G-V < 15 mlmin    ( may need dialysis)    ACR (urine albumin/creatinine ratio)    A-1       ACR<3  A-2 ACR 3-30  A-3 ACR >30    Our goal is to keep your eGFR going as fast as possible ( ml/min is normal). If your eGFR declines to 15-24 ml/min and stays there without recovery,  we will begin to educate you about dialysis or kidney transplant. The leading cause of kidney disease in the world is diabetes mellitus. Keep your sugar  as much as possible. The second leading cause is hypertension. Keep Your blood pressure 120-140/70-80 as much as possible. If you need refills, call the office or your drug store. You may call the office any time with any questions or concerns. Randye Savory Etter Runner

## 2020-10-08 LAB
BASOPHILS ABSOLUTE: NORMAL
BASOPHILS RELATIVE PERCENT: NORMAL
BUN BLDV-MCNC: 37 MG/DL
CALCIUM SERPL-MCNC: 9.2 MG/DL
CHLORIDE BLD-SCNC: 106 MMOL/L
CO2: 31 MMOL/L
CREAT SERPL-MCNC: 1.8 MG/DL
EOSINOPHILS ABSOLUTE: NORMAL
EOSINOPHILS RELATIVE PERCENT: NORMAL
GFR CALCULATED: 30
GLUCOSE BLD-MCNC: 73 MG/DL
HCT VFR BLD CALC: 43.3 % (ref 36–46)
HEMOGLOBIN: 13.8 G/DL (ref 12–16)
LYMPHOCYTES ABSOLUTE: NORMAL
LYMPHOCYTES RELATIVE PERCENT: NORMAL
MCH RBC QN AUTO: NORMAL PG
MCHC RBC AUTO-ENTMCNC: NORMAL G/DL
MCV RBC AUTO: NORMAL FL
MONOCYTES ABSOLUTE: NORMAL
MONOCYTES RELATIVE PERCENT: NORMAL
NEUTROPHILS ABSOLUTE: NORMAL
NEUTROPHILS RELATIVE PERCENT: NORMAL
PLATELET # BLD: 251 K/ΜL
PMV BLD AUTO: NORMAL FL
POTASSIUM SERPL-SCNC: 3.8 MMOL/L
RBC # BLD: 4.77 10^6/ΜL
SODIUM BLD-SCNC: 143 MMOL/L
WBC # BLD: 5.17 10^3/ML

## 2020-10-13 ENCOUNTER — OFFICE VISIT (OUTPATIENT)
Dept: NEPHROLOGY | Age: 70
End: 2020-10-13
Payer: MEDICARE

## 2020-10-13 VITALS
BODY MASS INDEX: 37 KG/M2 | HEART RATE: 77 BPM | WEIGHT: 196 LBS | DIASTOLIC BLOOD PRESSURE: 86 MMHG | TEMPERATURE: 96 F | SYSTOLIC BLOOD PRESSURE: 138 MMHG | OXYGEN SATURATION: 95 % | RESPIRATION RATE: 18 BRPM | HEIGHT: 61 IN

## 2020-10-13 PROCEDURE — 1036F TOBACCO NON-USER: CPT | Performed by: INTERNAL MEDICINE

## 2020-10-13 PROCEDURE — 4040F PNEUMOC VAC/ADMIN/RCVD: CPT | Performed by: INTERNAL MEDICINE

## 2020-10-13 PROCEDURE — G8417 CALC BMI ABV UP PARAM F/U: HCPCS | Performed by: INTERNAL MEDICINE

## 2020-10-13 PROCEDURE — 1123F ACP DISCUSS/DSCN MKR DOCD: CPT | Performed by: INTERNAL MEDICINE

## 2020-10-13 PROCEDURE — 3017F COLORECTAL CA SCREEN DOC REV: CPT | Performed by: INTERNAL MEDICINE

## 2020-10-13 PROCEDURE — 99213 OFFICE O/P EST LOW 20 MIN: CPT | Performed by: INTERNAL MEDICINE

## 2020-10-13 PROCEDURE — G8427 DOCREV CUR MEDS BY ELIG CLIN: HCPCS | Performed by: INTERNAL MEDICINE

## 2020-10-13 PROCEDURE — G8400 PT W/DXA NO RESULTS DOC: HCPCS | Performed by: INTERNAL MEDICINE

## 2020-10-13 PROCEDURE — 1090F PRES/ABSN URINE INCON ASSESS: CPT | Performed by: INTERNAL MEDICINE

## 2020-10-13 PROCEDURE — G8484 FLU IMMUNIZE NO ADMIN: HCPCS | Performed by: INTERNAL MEDICINE

## 2020-10-13 NOTE — PROGRESS NOTES
[facility] initial consultation note    Kim Kyle                                             Chief Complaint: Brett is here for a sleep consult, referred by Dr. Michael Goodson for Obesity. No prior studies. Chief complaint: Kim Kyle is a 77 y. o.oldfemale came for further evaluation regarding her pre operative evaluation for ?sleep apnea  with referral from Dr. Zain Irby MD    She is currently waiting for bariatric surgery. Her surgery was put on hold due to worsening of kidney function. She recently underwent Kidney biopsy at [facility] radiology. [place]:    Sleep/Wake schedule:  Usual time to go to bed during the work/regular day of week: 10:00 to 11:00 PM.  Usual time to wake up during the work//regular day of week: 8:00 to 9:00 AM.  Over the weekends her sleep schedule: [x] Remain same. She usually falls a sleep in less than: 15 minutes. She takes naps: No.      Sleep Hygiene:  Is the temperature and evironment in her bed room is acceptable to her: Yes. She watches Television in her bed room: Yes. She read books, study, pay bills etc in the bed: No.  Frequency She wake up during night/sleep: 2 to 3  Majority of nocturnal awakenings are for urination: Yes  Difficulty in falling back to sleep after nocturnal awakenings: No  .  Do you drink coffee: No.        Do you drink caffeinated beverages i.e sodas: Yes. 1can/s per day. Coke Zero     Do you drink tea:Yes. 1 cup/s/glasse/s per day. Do you drink alcoholic beverages: No.      History of recreational drug use: No.    History of tobacco smoking:No.      Sleep apnea symptoms:  Noticed to have loud snoring:Yes. Noted by her family member- Ex  and brother.   Witnessed apneas during sleep noticed: No.  History of choking and gasping sensation at night time: No.  History of headaches in the morning:No.  History of dry mouth in the morning: No.  History of palpitations during night time/nocturnal awakenings: No.  History of sweating during night time/nocturnal awakenings: No    General:  History of head injury in the past: No.    Associated loss of consciousness with head injury: No.  History of seizures: No.   Rest less legs syndrome symptoms:NO  History suggestive of periodic limb movements during sleep: NO  History suggestive of hypnagogic hallucinations: NO  History suggestive of hypnopompic hallucinations: NO  History suggestive of sleep talking:NO  History suggestive of sleep walking:NO  History suggestive of bruxism: No.   History suggestive of cataplexy: NO  History suggestive of sleep paralysis: NO    Family history of sleep disorders:  Family history of obstructive sleep apnea: NO.  Family history of Narcolepsy: NO.  Family history of Rest less legs syndrome : NO. She is an adopted child. No family history available. History regarding old sleep studies:  Prior history of sleep study: No.  Using CPAP device: No.  Currently using home Oxygen: NO.       Patient considerations:  Is the patient is ambulatory: Yes  Patient is currently using: None of these Wheelchair, [facility] Cramp or [employer]. Para/Quadriplegic: NO  Hearing deficit : NO  Claustrophobic: NO  MDD : NO  Blind: NO  Incontinent: NO  Para/Quadraplegi: NO.   Need transportation to and from Sleep Center:NO      Social History:  Social History     Tobacco Use    Smoking status: Never Smoker    Smokeless tobacco: Never Used   Substance Use Topics    Alcohol use: No    Drug use: No   .  She is currently working: No.       [x] Retired. 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  She also used to work at [employer] in the past.                           Past Medical History:   Diagnosis Date    CAD (coronary artery disease)     Chronic kidney disease     Stage III    Hypertension     Metabolic bone disease     NSAID long-term use     Renal insufficiency     Type II or unspecified type diabetes mellitus without mention of complication, not stated as uncontrolled        Past Surgical History:   Procedure Laterality Date    CORONARY ARTERY BYPASS GRAFT      2 vessel       Allergies   Allergen Reactions    Exenatide Other (See Comments)     pancreatitis  Other reaction(s): extreme headache    Morphine      Other reaction(s): Altered Mental Staus    Naproxen      Other reaction(s): Headache    Naproxen Sodium        Current Outpatient Medications   Medication Sig Dispense Refill    amoxicillin (AMOXIL) 875 MG tablet       PARoxetine (PAXIL) 20 MG tablet 20 mg daily      irbesartan (AVAPRO) 150 MG tablet 150 mg daily      traZODone (DESYREL) 50 MG tablet 50 mg as needed      atorvastatin (LIPITOR) 80 MG tablet 80 mg every evening      valsartan-hydrochlorothiazide (DIOVAN-HCT) 320-25 MG per tablet       atenolol (TENORMIN) 25 MG tablet 25 mg daily      amLODIPine (NORVASC) 10 MG tablet 10 mg daily      busPIRone (BUSPAR) 10 MG tablet 10 mg daily      fenofibrate 160 MG tablet 160 mg daily      gabapentin (NEURONTIN) 100 MG capsule 100 mg 3 times daily      INSULIN REGULAR HUMAN IJ Inject 10 Units as directed Plus sliding scale      insulin NPH (HUMULIN N;NOVOLIN N) 100 UNIT/ML injection vial Inject 50 Units into the skin nightly      Multiple Vitamins-Minerals (THERAPEUTIC MULTIVITAMIN-MINERALS) tablet Take 1 tablet by mouth daily.  ranitidine (ZANTAC) 150 MG capsule Take 150 mg by mouth 2 times daily.  levothyroxine (SYNTHROID) 50 MCG tablet Take 50 mcg by mouth Daily.  aspirin 81 MG tablet Take 81 mg by mouth daily. No current facility-administered medications for this visit. No family history on file. Review of Systems:   General/Constitutional: No recent loss of weight or appetite changes. No fever or chills. HENT: Negative. Eyes: Negative.   Upper respiratory tract: Frequent nasal stuffiness with is normal.     Diagnostic Data:  None related sleep. Assessment:  -Snoring without witnessed apneas,frequent nocturnal awakenings- need to evaluate for obstructive sleep apnea. -Inadequate sleep hygiene.  -Pre operative evaluation for planned bariatric surgery. -CAD (coronary artery disease) S/p CABG in 2011. She follows with MD Rick  -Chronic kidney disease Stage III. -Hypertension on meds- under control  -Type II or unspecified type diabetes mellitus without mention of complication, not stated as uncontrolled      Recommendations/Plan:  -Will schedule patient for polysomnogram in the sleep lab. -I had a discussion with patient regarding avialable treatment options for her sleep disorder breathing including but not limited to CPAP titration in the sleep lab Vs.Dental appliance placement with referral to a local dentist Vs other available surgical options including Uvulopalatopharyngoplasty, maxillomandibular ostomy, Inspire device placement with referral to [place] and tracheostomy as last option. At the end of discussion, she is not decided on her   treatment if she found to have obstructive sleep apnea at this time.  -We will see Johnson Kemp Mosley back in 1week after the sleep study to go over the sleep study results and further management options.  -She was educated to practice good sleep hygiene practices. She was provided with a good sleep hygiene hand out. -Johnson Kemp was advised to make earlier appointment with my clinic if she develops any worsening of sleep symptoms. She verbalizes understanding.  -Shewas advised to loose weight by controlling diet and doing exercise. She is currently following with [facility] weight management center with Dr. Janessa Quintero was educated about my impression and plan.  She verbalizes understanding.        -I personally reviewed updated the Past medical hx, Past surgical hx,Social hx, Family hx, Medications, Allergies in the discrete

## 2020-10-13 NOTE — PROGRESS NOTES
production. Heart[de-identified]            RRR. No murmurs nor rubs. PMI is not enlarged nor displaced. Abdomen:  Soft and non tender. Bowel sounds are active in all four quadrants. Extremities:  No edema  Neurologic:  CN II-XII are intact. No deficits noted. Muscle strength and tone are equal throughout. Skin:                Warm and dry with no rashes. Muscles:         Hand  and leg strength are equal and strong bilaterally.      Lab Data      CBC:   Lab Results   Component Value Date    WBC 5.17 10/08/2020    HGB 13.8 10/08/2020    HCT 43.3 10/08/2020     10/08/2020     BMP:    Lab Results   Component Value Date     10/08/2020     03/09/2020     09/03/2019    K 3.8 10/08/2020    K 4.5 03/09/2020    K 3.9 09/03/2019     10/08/2020     03/09/2020     09/03/2019    CO2 31 10/08/2020    CO2 29 03/09/2020    CO2 29 09/03/2019    BUN 37 10/08/2020    BUN 32 03/09/2020    BUN 24 09/03/2019    CREATININE 1.8 10/08/2020    CREATININE 1.5 03/09/2020    CREATININE 1.2 09/03/2019    GLUCOSE 73 10/08/2020    GLUCOSE 314 03/09/2020    GLUCOSE 123 09/03/2019      Hepatic:   Lab Results   Component Value Date    AST 27 01/08/2019    AST 19 05/12/2018    AST 15 12/06/2014    ALT 17 01/08/2019    ALT 30 05/12/2018    ALT 24 12/06/2014    BILITOT 0.3 01/08/2019    BILITOT 0.2 05/12/2018    BILITOT 0.3 12/06/2014    ALKPHOS 59 01/08/2019    ALKPHOS 67 05/12/2018    ALKPHOS 79 12/06/2014     BNP: No results found for: BNP  Lipids:   Lab Results   Component Value Date    CHOL 218 03/09/2020    HDL 29 (A) 03/09/2020     INR: No results found for: INR  URINE: No results found for: NAUR, PROTUR  No results found for: NITRU, COLORU, PHUR, LABCAST, WBCUA, RBCUA, MUCUS, TRICHOMONAS, YEAST, BACTERIA, CLARITYU, SPECGRAV, LEUKOCYTESUR, UROBILINOGEN, BILIRUBINUR, BLOODU, GLUCOSEU, KETUA, AMORPHOUS   Microalbumen/Creatinine ratio:  No components found for: RUCREAT        Medications:    Current Outpatient Medications   Medication Sig Dispense Refill    PARoxetine (PAXIL) 20 MG tablet 20 mg daily      irbesartan (AVAPRO) 150 MG tablet 150 mg daily      traZODone (DESYREL) 50 MG tablet 50 mg as needed      atorvastatin (LIPITOR) 80 MG tablet 80 mg every evening      valsartan-hydrochlorothiazide (DIOVAN-HCT) 320-25 MG per tablet       atenolol (TENORMIN) 25 MG tablet 25 mg daily      amLODIPine (NORVASC) 10 MG tablet 10 mg daily      busPIRone (BUSPAR) 10 MG tablet 10 mg daily      fenofibrate 160 MG tablet 160 mg daily      gabapentin (NEURONTIN) 100 MG capsule 100 mg 3 times daily      INSULIN REGULAR HUMAN IJ Inject 10 Units as directed Plus sliding scale      insulin NPH (HUMULIN N;NOVOLIN N) 100 UNIT/ML injection vial Inject 50 Units into the skin nightly      Multiple Vitamins-Minerals (THERAPEUTIC MULTIVITAMIN-MINERALS) tablet Take 1 tablet by mouth daily.  ranitidine (ZANTAC) 150 MG capsule Take 150 mg by mouth 2 times daily.  levothyroxine (SYNTHROID) 50 MCG tablet Take 50 mcg by mouth Daily.  aspirin 81 MG tablet Take 81 mg by mouth daily. No current facility-administered medications for this visit. IMPRESSIONS:        Kidney disease: CKD stage G-IV-A1  Acute kidney injury (versus lab error)  Anemia: Anemia remains stable. No need for an erythrocyte stimulating agent (VICTORIANO). Bone and mineral metabolism: There is no complaint of bone pain. PLAN:  1. We discussed the eGFR today. 2. We will continue all current medications without changes. 3. Checking protein/creatinine ratio. 4. We will see the patient back in 0.5 months.       _________________________________  Will Land.  Lizette Valles, DO  [facility]      Ebony Quezada MD

## 2020-10-13 NOTE — PATIENT INSTRUCTIONS
KNOW YOUR KIDNEY NUMBERS    Your kidney speed (eGFR) was 30 ml/min this visit (normal is  ml/min)(Ml/min=milliliters of blood filtered per minute). The higher this number is, the better your kidney function is. Your serum creatinine was 1.8 (normal 0.8-1.2 mg/dl at most labs). The higher this number is, the worse your kidney function is. You are in stage G-IIIB-A1 of chronic kidney disease. Your kidney function has declined as compared to your last visit. Your last eGFR was  54 Ml/Min. Stages of kidney disease    EGFR (estimated glomerular filtration rate)    G-I > 90 ml/min Kidney damage with normal kidney function (blood or protein in the urine)  G-II 60-89 ml/min Normal kidney function with mild damage with or without blood or protein in the urine  G-IIIA 45-59 ml/min Mild to moderate loss of kidney function. G-IIIB 30-44 ml/min Moderate to severe loss of kidney function  G-IV 15-29 ml/min Severe loss of kidney function  G-V < 15 mlmin     May need dialysis or kidney transplant    ACR (urine albumin/creatinine ratio) (Mg/Gm)    A-1      ACR<30 Normal to mildly increased protein in the urine. A-2 ACR  Moderate increase in urine protein loss. A-3 ACR >300 Severe increase in urine protein loss    Our goal is to keep your eGFR going as fast as possible ( ml/min is normal). If your eGFR declines to 15-24 ml/min and stays there without recovery,  we will begin to educate you about dialysis or kidney transplant. We also want to keep the protein in your urine as low as possible. The leading cause of kidney disease in the world is diabetes mellitus. Keep your sugar  as much as possible. The second leading cause is hypertension. Keep Your blood pressure 120-140/70-80 as much as possible. If you need refills, call the office or your drug store. You may call the office any time with any questions or concerns.     DUE TO THE CORONAVIRUS CONCERN, PLEASE LIMIT YOUR TIME IN PUBLIC. [address] YOUR HANDS COMPLETELY AND FREQUENTLY. Yeison Cai

## 2020-10-23 LAB
BASOPHILS ABSOLUTE: NORMAL
BASOPHILS RELATIVE PERCENT: NORMAL
BUN BLDV-MCNC: 48 MG/DL
CALCIUM SERPL-MCNC: 9.4 MG/DL
CHLORIDE BLD-SCNC: 103 MMOL/L
CO2: 30 MMOL/L
CREAT SERPL-MCNC: 1.8 MG/DL
EOSINOPHILS ABSOLUTE: NORMAL
EOSINOPHILS RELATIVE PERCENT: NORMAL
GFR CALCULATED: 30
GLUCOSE BLD-MCNC: 153 MG/DL
HCT VFR BLD CALC: 43.9 % (ref 36–46)
HEMOGLOBIN: 14.1 G/DL (ref 12–16)
LYMPHOCYTES ABSOLUTE: NORMAL
LYMPHOCYTES RELATIVE PERCENT: NORMAL
MCH RBC QN AUTO: NORMAL PG
MCHC RBC AUTO-ENTMCNC: NORMAL G/DL
MCV RBC AUTO: NORMAL FL
MONOCYTES ABSOLUTE: NORMAL
MONOCYTES RELATIVE PERCENT: NORMAL
NEUTROPHILS ABSOLUTE: NORMAL
NEUTROPHILS RELATIVE PERCENT: NORMAL
PLATELET # BLD: 266 K/ΜL
PMV BLD AUTO: NORMAL FL
POTASSIUM SERPL-SCNC: 4.3 MMOL/L
RBC # BLD: 4.82 10^6/ΜL
SODIUM BLD-SCNC: 141 MMOL/L
WBC # BLD: 5.85 10^3/ML

## 2020-10-27 ENCOUNTER — OFFICE VISIT (OUTPATIENT)
Dept: NEPHROLOGY | Age: 70
End: 2020-10-27
Payer: MEDICARE

## 2020-10-27 VITALS
DIASTOLIC BLOOD PRESSURE: 78 MMHG | BODY MASS INDEX: 37.41 KG/M2 | SYSTOLIC BLOOD PRESSURE: 138 MMHG | WEIGHT: 198 LBS | OXYGEN SATURATION: 92 % | HEART RATE: 76 BPM | RESPIRATION RATE: 18 BRPM | TEMPERATURE: 97.6 F

## 2020-10-27 PROCEDURE — 99214 OFFICE O/P EST MOD 30 MIN: CPT | Performed by: INTERNAL MEDICINE

## 2020-10-27 PROCEDURE — G8484 FLU IMMUNIZE NO ADMIN: HCPCS | Performed by: INTERNAL MEDICINE

## 2020-10-27 PROCEDURE — G8400 PT W/DXA NO RESULTS DOC: HCPCS | Performed by: INTERNAL MEDICINE

## 2020-10-27 PROCEDURE — G8427 DOCREV CUR MEDS BY ELIG CLIN: HCPCS | Performed by: INTERNAL MEDICINE

## 2020-10-27 PROCEDURE — 1090F PRES/ABSN URINE INCON ASSESS: CPT | Performed by: INTERNAL MEDICINE

## 2020-10-27 PROCEDURE — 3017F COLORECTAL CA SCREEN DOC REV: CPT | Performed by: INTERNAL MEDICINE

## 2020-10-27 PROCEDURE — G8417 CALC BMI ABV UP PARAM F/U: HCPCS | Performed by: INTERNAL MEDICINE

## 2020-10-27 PROCEDURE — 1036F TOBACCO NON-USER: CPT | Performed by: INTERNAL MEDICINE

## 2020-10-27 PROCEDURE — 4040F PNEUMOC VAC/ADMIN/RCVD: CPT | Performed by: INTERNAL MEDICINE

## 2020-10-27 PROCEDURE — 1123F ACP DISCUSS/DSCN MKR DOCD: CPT | Performed by: INTERNAL MEDICINE

## 2020-10-27 NOTE — PROGRESS NOTES
and no bruits. Thyroid gland is normal  Lungs:  Breathing easily. No rales nor rhonchi. No cough nor sputum production  Heart[de-identified]            RRR. No murmurs nor rubs. PMI is not enlarged nor displaced. Abdomen:  Soft and non tender. Bowel sounds are active in all four quadrants. Extremities:  No edema  Neurologic:  CN II-XII are intact. No deficits noted. Muscle strength and tone are equal throughout. Skin:                Warm and dry with no rashes. Muscles:         Hand  and leg strength are equal and strong bilaterally.      Lab Data      CBC:   Lab Results   Component Value Date    WBC 5.85 10/23/2020    HGB 14.1 10/23/2020    HCT 43.9 10/23/2020     10/23/2020     BMP:    Lab Results   Component Value Date     10/23/2020     10/08/2020     03/09/2020    K 4.3 10/23/2020    K 3.8 10/08/2020    K 4.5 03/09/2020     10/23/2020     10/08/2020     03/09/2020    CO2 30 10/23/2020    CO2 31 10/08/2020    CO2 29 03/09/2020    BUN 48 10/23/2020    BUN 37 10/08/2020    BUN 32 03/09/2020    CREATININE 1.8 10/23/2020    CREATININE 1.8 10/08/2020    CREATININE 1.5 03/09/2020    GLUCOSE 153 10/23/2020    GLUCOSE 73 10/08/2020    GLUCOSE 314 03/09/2020      Hepatic:   Lab Results   Component Value Date    AST 27 01/08/2019    AST 19 05/12/2018    AST 15 12/06/2014    ALT 17 01/08/2019    ALT 30 05/12/2018    ALT 24 12/06/2014    BILITOT 0.3 01/08/2019    BILITOT 0.2 05/12/2018    BILITOT 0.3 12/06/2014    ALKPHOS 59 01/08/2019    ALKPHOS 67 05/12/2018    ALKPHOS 79 12/06/2014     BNP: No results found for: BNP  Lipids:   Lab Results   Component Value Date    CHOL 218 03/09/2020    HDL 29 (A) 03/09/2020     INR: No results found for: INR  URINE: No results found for: Donata Lose  No results found for: ADELA Moran, LABCAST, [address], RBCUA, MUCUS, TRICHOMONAS, YEAST, BACTERIA, CLARITYU, [address], [address], [address], BILIRUBINUR, BLOODU, GLUCOSEU, KETUA, AMORPHOUS Microalbumen/Creatinine ratio:  No components found for: RUCREAT        Medications:    Current Outpatient Medications   Medication Sig Dispense Refill    PARoxetine (PAXIL) 20 MG tablet 20 mg daily      irbesartan (AVAPRO) 150 MG tablet 150 mg daily      traZODone (DESYREL) 50 MG tablet 50 mg as needed      atorvastatin (LIPITOR) 80 MG tablet 80 mg every evening      valsartan-hydrochlorothiazide (DIOVAN-HCT) 320-25 MG per tablet       atenolol (TENORMIN) 25 MG tablet 25 mg daily      amLODIPine (NORVASC) 10 MG tablet 10 mg daily      busPIRone (BUSPAR) 10 MG tablet 10 mg daily      fenofibrate 160 MG tablet 160 mg daily      gabapentin (NEURONTIN) 100 MG capsule 100 mg 3 times daily      INSULIN REGULAR HUMAN IJ Inject 10 Units as directed Plus sliding scale      insulin NPH (HUMULIN N;NOVOLIN N) 100 UNIT/ML injection vial Inject 50 Units into the skin nightly      Multiple Vitamins-Minerals (THERAPEUTIC MULTIVITAMIN-MINERALS) tablet Take 1 tablet by mouth daily.  ranitidine (ZANTAC) 150 MG capsule Take 150 mg by mouth 2 times daily.  levothyroxine (SYNTHROID) 50 MCG tablet Take 50 mcg by mouth Daily.  aspirin 81 MG tablet Take 81 mg by mouth daily. No current facility-administered medications for this visit. IMPRESSIONS:      Kidney disease: CKD stage G-IIIB-A1  Anemia: Anemia remains stable. No need for an erythrocyte stimulating agent (VICTORIANO). Bone and mineral metabolism: There is no complaint of bone pain. PLAN:  1. We discussed the eGFR today. 2. We will continue all current medications without changes. 3. UPEP, SPEP,  4. Checking protein/creatinine ratio. 5. PLA2R  6. Kidney biopsy  7. We will see the patient back in 1 months.       _________________________________  [facility].  Chayo Peng, DO  [facility]      Adama Do MD

## 2020-10-27 NOTE — PATIENT INSTRUCTIONS
KNOW YOUR KIDNEY NUMBERS    Your kidney speed (eGFR) was 30 ml/min this visit (normal is  ml/min)(Ml/min=milliliters of blood filtered per minute). The higher this number is, the better your kidney function is. Your serum creatinine was 1.8 (normal 0.8-1.2 mg/dl at most labs). The higher this number is, the worse your kidney function is. You are in stage G-IIIB-A1 of chronic kidney disease. Your kidney function has remained stable as compared to your last visit. Your last eGFR was  30 Ml/Min. Stages of kidney disease    EGFR (estimated glomerular filtration rate)    G-I > 90 ml/min Kidney damage with normal kidney function (blood or protein in the urine)  G-II 60-89 ml/min Normal kidney function with mild damage with or without blood or protein in the urine  G-IIIA 45-59 ml/min Mild to moderate loss of kidney function. G-IIIB 30-44 ml/min Moderate to severe loss of kidney function  G-IV 15-29 ml/min Severe loss of kidney function  G-V < 15 mlmin     May need dialysis or kidney transplant    ACR (urine albumin/creatinine ratio) (Mg/Gm)    A-1      ACR<30 Normal to mildly increased protein in the urine. A-2 ACR  Moderate increase in urine protein loss. A-3 ACR >300 Severe increase in urine protein loss    Our goal is to keep your eGFR going as fast as possible ( ml/min is normal). If your eGFR declines to 15-24 ml/min and stays there without recovery,  we will begin to educate you about dialysis or kidney transplant. We also want to keep the protein in your urine as low as possible. The leading cause of kidney disease in the world is diabetes mellitus. Keep your sugar  as much as possible. The second leading cause is hypertension. Keep Your blood pressure 120-140/70-80 as much as possible. If you need refills, call the office or your drug store. You may call the office any time with any questions or concerns.     DUE TO THE CORONAVIRUS CONCERN, PLEASE LIMIT YOUR TIME IN PUBLIC. [address] YOUR HANDS COMPLETELY AND FREQUENTLY. Leah Mckeon

## 2020-11-12 ENCOUNTER — INITIAL CONSULT (OUTPATIENT)
Dept: PULMONOLOGY | Age: 70
End: 2020-11-12
Payer: MEDICARE

## 2020-11-12 VITALS
SYSTOLIC BLOOD PRESSURE: 122 MMHG | OXYGEN SATURATION: 97 % | WEIGHT: 205 LBS | BODY MASS INDEX: 38.71 KG/M2 | HEIGHT: 61 IN | DIASTOLIC BLOOD PRESSURE: 70 MMHG | HEART RATE: 67 BPM

## 2020-11-12 PROCEDURE — G8400 PT W/DXA NO RESULTS DOC: HCPCS | Performed by: INTERNAL MEDICINE

## 2020-11-12 PROCEDURE — 1090F PRES/ABSN URINE INCON ASSESS: CPT | Performed by: INTERNAL MEDICINE

## 2020-11-12 PROCEDURE — G8417 CALC BMI ABV UP PARAM F/U: HCPCS | Performed by: INTERNAL MEDICINE

## 2020-11-12 PROCEDURE — G8427 DOCREV CUR MEDS BY ELIG CLIN: HCPCS | Performed by: INTERNAL MEDICINE

## 2020-11-12 PROCEDURE — 99204 OFFICE O/P NEW MOD 45 MIN: CPT | Performed by: INTERNAL MEDICINE

## 2020-11-12 PROCEDURE — 1123F ACP DISCUSS/DSCN MKR DOCD: CPT | Performed by: INTERNAL MEDICINE

## 2020-11-12 PROCEDURE — G8484 FLU IMMUNIZE NO ADMIN: HCPCS | Performed by: INTERNAL MEDICINE

## 2020-11-12 PROCEDURE — 1036F TOBACCO NON-USER: CPT | Performed by: INTERNAL MEDICINE

## 2020-11-12 PROCEDURE — 3017F COLORECTAL CA SCREEN DOC REV: CPT | Performed by: INTERNAL MEDICINE

## 2020-11-12 PROCEDURE — 4040F PNEUMOC VAC/ADMIN/RCVD: CPT | Performed by: INTERNAL MEDICINE

## 2020-11-12 RX ORDER — AMOXICILLIN 875 MG/1
TABLET, COATED ORAL
COMMUNITY
Start: 2020-11-11 | End: 2021-05-25 | Stop reason: ALTCHOICE

## 2020-11-12 NOTE — PATIENT INSTRUCTIONS
Recommendations/Plan:  -Will schedule patient for polysomnogram in the sleep lab. -I had a discussion with patient regarding avialable treatment options for her sleep disorder breathing including but not limited to CPAP titration in the sleep lab Vs.Dental appliance placement with referral to a local dentist Vs other available surgical options including Uvulopalatopharyngoplasty, maxillomandibular ostomy, Inspire device placement with referral to [place] and tracheostomy as last option. At the end of discussion, she is not decided on her   treatment if she found to have obstructive sleep apnea at this time.  -We will see Weston Comer Mosley back in 1week after the sleep study to go over the sleep study results and further management options.  -She was educated to practice good sleep hygiene practices. She was provided with a good sleep hygiene hand out. -Weston Comer was advised to make earlier appointment with my clinic if she develops any worsening of sleep symptoms. She verbalizes understanding.  -Shewas advised to loose weight by controlling diet and doing exercise. She is currently following with [facility] weight management center with Dr. Dany Mckeon was educated about my impression and plan. She verbalizes understanding.

## 2020-11-19 LAB
ALBUMIN SERPL-MCNC: 3.9 G/DL
ALP BLD-CCNC: 90 U/L
ALT SERPL-CCNC: 42 U/L
ANION GAP SERPL CALCULATED.3IONS-SCNC: NORMAL MMOL/L
AST SERPL-CCNC: 26 U/L
BASOPHILS ABSOLUTE: NORMAL
BASOPHILS RELATIVE PERCENT: NORMAL
BILIRUB SERPL-MCNC: 0.2 MG/DL (ref 0.1–1.4)
BUN BLDV-MCNC: 29 MG/DL
CALCIUM SERPL-MCNC: 9.4 MG/DL
CHLORIDE BLD-SCNC: 102 MMOL/L
CO2: 29 MMOL/L
CREAT SERPL-MCNC: 1.4 MG/DL
EOSINOPHILS ABSOLUTE: NORMAL
EOSINOPHILS RELATIVE PERCENT: NORMAL
GFR CALCULATED: 40
GLUCOSE BLD-MCNC: 144 MG/DL
HCT VFR BLD CALC: 44.6 % (ref 36–46)
HEMOGLOBIN: 14.6 G/DL (ref 12–16)
LYMPHOCYTES ABSOLUTE: NORMAL
LYMPHOCYTES RELATIVE PERCENT: NORMAL
MCH RBC QN AUTO: NORMAL PG
MCHC RBC AUTO-ENTMCNC: NORMAL G/DL
MCV RBC AUTO: NORMAL FL
MONOCYTES ABSOLUTE: NORMAL
MONOCYTES RELATIVE PERCENT: NORMAL
NEUTROPHILS ABSOLUTE: NORMAL
NEUTROPHILS RELATIVE PERCENT: NORMAL
PLATELET # BLD: 263 K/ΜL
PMV BLD AUTO: NORMAL FL
POTASSIUM SERPL-SCNC: 4.1 MMOL/L
RBC # BLD: 5.01 10^6/ΜL
SODIUM BLD-SCNC: 139 MMOL/L
TOTAL PROTEIN: 8.1
WBC # BLD: 4.77 10^3/ML

## 2020-11-24 ENCOUNTER — OFFICE VISIT (OUTPATIENT)
Dept: NEPHROLOGY | Age: 70
End: 2020-11-24
Payer: MEDICARE

## 2020-11-24 VITALS
HEART RATE: 101 BPM | OXYGEN SATURATION: 92 % | TEMPERATURE: 97.6 F | BODY MASS INDEX: 38.33 KG/M2 | RESPIRATION RATE: 18 BRPM | DIASTOLIC BLOOD PRESSURE: 64 MMHG | WEIGHT: 203 LBS | HEIGHT: 61 IN | SYSTOLIC BLOOD PRESSURE: 118 MMHG

## 2020-11-24 PROBLEM — G89.29 CHRONIC PAIN OF TOES OF BOTH FEET: Status: ACTIVE | Noted: 2020-11-24

## 2020-11-24 PROBLEM — K42.9 UMBILICAL HERNIA: Status: ACTIVE | Noted: 2020-11-24

## 2020-11-24 PROBLEM — Z91.89 AT RISK OF DISEASE: Status: ACTIVE | Noted: 2020-11-24

## 2020-11-24 PROBLEM — H10.9 CONJUNCTIVITIS: Status: ACTIVE | Noted: 2020-11-24

## 2020-11-24 PROBLEM — E78.2 MIXED HYPERLIPIDEMIA: Status: ACTIVE | Noted: 2020-11-24

## 2020-11-24 PROBLEM — Z79.4 LONG TERM CURRENT USE OF INSULIN (HCC): Status: ACTIVE | Noted: 2020-11-24

## 2020-11-24 PROBLEM — J32.9 SINUSITIS: Status: ACTIVE | Noted: 2020-11-24

## 2020-11-24 PROBLEM — G47.30 SLEEP-RELATED BREATHING DISORDER: Status: ACTIVE | Noted: 2020-11-24

## 2020-11-24 PROBLEM — M79.674 CHRONIC PAIN OF TOES OF BOTH FEET: Status: ACTIVE | Noted: 2020-11-24

## 2020-11-24 PROBLEM — K21.9 GASTROESOPHAGEAL REFLUX DISEASE: Status: ACTIVE | Noted: 2020-11-24

## 2020-11-24 PROBLEM — N39.0 ACUTE URINARY TRACT INFECTION: Status: ACTIVE | Noted: 2020-11-24

## 2020-11-24 PROBLEM — M25.561 RIGHT KNEE PAIN: Status: ACTIVE | Noted: 2020-11-24

## 2020-11-24 PROBLEM — M79.675 CHRONIC PAIN OF TOES OF BOTH FEET: Status: ACTIVE | Noted: 2020-11-24

## 2020-11-24 PROBLEM — Z78.0 ASYMPTOMATIC AGE-RELATED POSTMENOPAUSAL STATE: Status: ACTIVE | Noted: 2020-11-24

## 2020-11-24 PROBLEM — Z79.82 LONG TERM CURRENT USE OF ASPIRIN: Status: ACTIVE | Noted: 2020-11-24

## 2020-11-24 PROBLEM — I10 BENIGN ESSENTIAL HYPERTENSION: Status: ACTIVE | Noted: 2020-11-24

## 2020-11-24 PROBLEM — E66.01 MORBID OBESITY (HCC): Status: ACTIVE | Noted: 2020-11-24

## 2020-11-24 PROBLEM — G47.00 PERSISTENT INSOMNIA: Status: ACTIVE | Noted: 2020-11-24

## 2020-11-24 PROBLEM — M76.891 TENDINITIS OF RIGHT KNEE: Status: ACTIVE | Noted: 2020-11-24

## 2020-11-24 PROBLEM — R09.89 SINUS SYMPTOM: Status: ACTIVE | Noted: 2020-11-24

## 2020-11-24 PROBLEM — Z78.9 NON-SMOKER: Status: ACTIVE | Noted: 2020-11-24

## 2020-11-24 PROBLEM — H61.23 BILATERAL IMPACTED CERUMEN: Status: ACTIVE | Noted: 2020-11-24

## 2020-11-24 PROCEDURE — 4040F PNEUMOC VAC/ADMIN/RCVD: CPT | Performed by: INTERNAL MEDICINE

## 2020-11-24 PROCEDURE — G8417 CALC BMI ABV UP PARAM F/U: HCPCS | Performed by: INTERNAL MEDICINE

## 2020-11-24 PROCEDURE — 1123F ACP DISCUSS/DSCN MKR DOCD: CPT | Performed by: INTERNAL MEDICINE

## 2020-11-24 PROCEDURE — 1090F PRES/ABSN URINE INCON ASSESS: CPT | Performed by: INTERNAL MEDICINE

## 2020-11-24 PROCEDURE — 3017F COLORECTAL CA SCREEN DOC REV: CPT | Performed by: INTERNAL MEDICINE

## 2020-11-24 PROCEDURE — G8427 DOCREV CUR MEDS BY ELIG CLIN: HCPCS | Performed by: INTERNAL MEDICINE

## 2020-11-24 PROCEDURE — 1036F TOBACCO NON-USER: CPT | Performed by: INTERNAL MEDICINE

## 2020-11-24 PROCEDURE — 99214 OFFICE O/P EST MOD 30 MIN: CPT | Performed by: INTERNAL MEDICINE

## 2020-11-24 PROCEDURE — G8484 FLU IMMUNIZE NO ADMIN: HCPCS | Performed by: INTERNAL MEDICINE

## 2020-11-24 PROCEDURE — G8400 PT W/DXA NO RESULTS DOC: HCPCS | Performed by: INTERNAL MEDICINE

## 2020-11-24 NOTE — PATIENT INSTRUCTIONS

## 2020-11-24 NOTE — PROGRESS NOTES
[facility]    [address]  [address], [address]  [phone]       Progress Note    11/24/2020 12:05 PM    Pt Name:    Nic Hedrick  YOB: 1950  Primary Care Physician:  Glo Sifuentes MD       Chief Complaint:   Chief Complaint   Patient presents with    Chronic Kidney Disease    Anemia    Diabetes    Hematuria    Nephropathy     biopsy proven secondary FSGS    Obesity    Proteinuria    Other     nephrotic syndrome        History of Chief Complaint: CKD  Stage G-IIIA-A1 from DM and HTN and biopsy provrn secondary FSGS. Subjective:  I last saw the patient in clinic 10/27/2020. I follow the patient for Chronic Kidney disease stage G-IIIB-A2. Since our last visit the patient has not been hospitalized. The patient is sleeping well at night with 1-2 times per night nocturia. The patient has a good appetite and is remaining active. The patient denied N/V/C/D/SOB/CP. She has no trouble at bladder emptying. Kidney biopsy showed DM, HTN and secondary FSGS. COVID-19 screening  Fever: none  Cough: none  Exposure: none  Shortness of breath: none    Protein/creatinine ratio: 1.9  eGFR: 40 Ml/min      Last six eGFR readings:  Lab Results   Component Value Date    LABGLOM 40 11/19/2020    LABGLOM 30 10/23/2020    LABGLOM 30 10/08/2020    LABGLOM 53 03/09/2020    LABGLOM 48 09/03/2019    LABGLOM 40 01/08/2019          Objective:  VITALS:  /64 (Site: Right Upper Arm, Position: Sitting, Cuff Size: Large Adult)   Pulse 101   Temp 97.6 °F (36.4 °C)   Resp 18   Ht 5' 1\" (1.549 m)   Wt 203 lb (92.1 kg)   SpO2 92%   BMI 38.36 kg/m²   Weight:   Wt Readings from Last 3 Encounters:   11/24/20 203 lb (92.1 kg)   11/12/20 205 lb (93 kg)   10/27/20 198 lb (89.8 kg)     Body mass index is 38.36 kg/m². Physical examination    General:  Alert and cooperative with exam  HEENT:  Normocephalic. No lesions nor rashes. TOÑA.  EOMI  Neck:   No JVD and no bruits. Thyroid gland is normal  Lungs:  Breathing easily. No rales nor rhonchi. No cough nor sputum production. Heart[de-identified]            RRR. No murmurs nor rubs. PMI is not enlarged nor displaced. Abdomen:  Soft and non tender. Bowel sounds are active in all four quadrants. Extremities:  2+ edema  Neurologic:  CN II-XII are intact. No deficits noted. Muscle strength and tone are equal throughout. Skin:                Warm and dry with no rashes. Muscles:         Hand  and leg strength are equal and strong bilaterally.      Lab Data      CBC:   Lab Results   Component Value Date    WBC 4.77 11/19/2020    HGB 14.6 11/19/2020    HCT 44.6 11/19/2020     11/19/2020     BMP:    Lab Results   Component Value Date     11/19/2020     10/23/2020     10/08/2020    K 4.1 11/19/2020    K 4.3 10/23/2020    K 3.8 10/08/2020     11/19/2020     10/23/2020     10/08/2020    CO2 29 11/19/2020    CO2 30 10/23/2020    CO2 31 10/08/2020    BUN 29 11/19/2020    BUN 48 10/23/2020    BUN 37 10/08/2020    CREATININE 1.4 11/19/2020    CREATININE 1.8 10/23/2020    CREATININE 1.8 10/08/2020    GLUCOSE 144 11/19/2020    GLUCOSE 153 10/23/2020    GLUCOSE 73 10/08/2020      Hepatic:   Lab Results   Component Value Date    AST 26 11/19/2020    AST 27 01/08/2019    AST 19 05/12/2018    ALT 42 11/19/2020    ALT 17 01/08/2019    ALT 30 05/12/2018    BILITOT 0.2 11/19/2020    BILITOT 0.3 01/08/2019    BILITOT 0.2 05/12/2018    ALKPHOS 90 11/19/2020    ALKPHOS 59 01/08/2019    ALKPHOS 67 05/12/2018     BNP: No results found for: BNP  Lipids:   Lab Results   Component Value Date    CHOL 218 03/09/2020    HDL 29 (A) 03/09/2020     INR: No results found for: INR  URINE: No results found for: Pito Diss  No results found for: ADELA Das, LABCAST, [address], RBCUA, MUCUS, TRICHOMONAS, YEAST, BACTERIA, CLARITYU, [address], [address], [address], BILIRUBINUR, BLOODU, GLUCOSEU, [address], AMORPHOUS Microalbumen/Creatinine ratio:  No components found for: RUCREAT        Medications:    Current Outpatient Medications   Medication Sig Dispense Refill    amoxicillin (AMOXIL) 875 MG tablet       PARoxetine (PAXIL) 20 MG tablet 20 mg daily      irbesartan (AVAPRO) 150 MG tablet 150 mg daily      traZODone (DESYREL) 50 MG tablet 50 mg as needed      atorvastatin (LIPITOR) 80 MG tablet 80 mg every evening      valsartan-hydrochlorothiazide (DIOVAN-HCT) 320-25 MG per tablet       atenolol (TENORMIN) 25 MG tablet 25 mg daily      amLODIPine (NORVASC) 10 MG tablet 10 mg daily      busPIRone (BUSPAR) 10 MG tablet 10 mg daily      fenofibrate 160 MG tablet 160 mg daily      gabapentin (NEURONTIN) 100 MG capsule 100 mg 3 times daily      INSULIN REGULAR HUMAN IJ Inject 10 Units as directed Plus sliding scale      insulin NPH (HUMULIN N;NOVOLIN N) 100 UNIT/ML injection vial Inject 50 Units into the skin nightly      Multiple Vitamins-Minerals (THERAPEUTIC MULTIVITAMIN-MINERALS) tablet Take 1 tablet by mouth daily.  ranitidine (ZANTAC) 150 MG capsule Take 150 mg by mouth 2 times daily.  levothyroxine (SYNTHROID) 50 MCG tablet Take 50 mcg by mouth Daily.  aspirin 81 MG tablet Take 81 mg by mouth daily. No current facility-administered medications for this visit. IMPRESSIONS:      Kidney disease: CKD stage G-IIIB-A2  Anemia: Anemia remains stable. No need for an erythrocyte stimulating agent (VICTORIANO). Bone and mineral metabolism: There is no complaint of bone pain. PLAN:  1. We discussed the eGFR today. 2. We will continue all current medications without changes. 3. Checking protein/creatinine ratio. 4. Checking C peptide to see if she will respond to GLP-1 analog. 5. We will see the patient back in 1 months.       _________________________________  Will Land.  Lizette Valles DO  [facility]      Ebony Quezada MD

## 2020-12-17 LAB
BASOPHILS ABSOLUTE: NORMAL
BASOPHILS RELATIVE PERCENT: NORMAL
BUN BLDV-MCNC: 32 MG/DL
CALCIUM SERPL-MCNC: 9.3 MG/DL
CHLORIDE BLD-SCNC: 101 MMOL/L
CO2: 30 MMOL/L
CREAT SERPL-MCNC: 1.7 MG/DL
EOSINOPHILS ABSOLUTE: NORMAL
EOSINOPHILS RELATIVE PERCENT: NORMAL
GFR CALCULATED: 32
GLUCOSE BLD-MCNC: 114 MG/DL
HCT VFR BLD CALC: 43 % (ref 36–46)
HEMOGLOBIN: 14.2 G/DL (ref 12–16)
LYMPHOCYTES ABSOLUTE: NORMAL
LYMPHOCYTES RELATIVE PERCENT: NORMAL
MCH RBC QN AUTO: NORMAL PG
MCHC RBC AUTO-ENTMCNC: NORMAL G/DL
MCV RBC AUTO: NORMAL FL
MONOCYTES ABSOLUTE: NORMAL
MONOCYTES RELATIVE PERCENT: NORMAL
NEUTROPHILS ABSOLUTE: NORMAL
NEUTROPHILS RELATIVE PERCENT: NORMAL
PLATELET # BLD: 266 K/ΜL
PMV BLD AUTO: NORMAL FL
POTASSIUM SERPL-SCNC: 4 MMOL/L
RBC # BLD: 4.86 10^6/ΜL
SODIUM BLD-SCNC: 138 MMOL/L
WBC # BLD: 4.77 10^3/ML

## 2020-12-17 RX ORDER — ASPIRIN 325 MG
325 TABLET ORAL
COMMUNITY
Start: 2020-09-28

## 2020-12-17 RX ORDER — KETOTIFEN FUMARATE 0.35 MG/ML
SOLUTION/ DROPS OPHTHALMIC
COMMUNITY
Start: 2020-05-12 | End: 2022-01-14

## 2020-12-17 RX ORDER — NICOTINE POLACRILEX 4 MG/1
20 GUM, CHEWING ORAL DAILY
COMMUNITY
Start: 2020-05-12 | End: 2022-01-14 | Stop reason: ALTCHOICE

## 2020-12-22 ENCOUNTER — OFFICE VISIT (OUTPATIENT)
Dept: NEPHROLOGY | Age: 70
End: 2020-12-22
Payer: MEDICARE

## 2020-12-22 VITALS
OXYGEN SATURATION: 95 % | RESPIRATION RATE: 18 BRPM | DIASTOLIC BLOOD PRESSURE: 75 MMHG | BODY MASS INDEX: 37.38 KG/M2 | SYSTOLIC BLOOD PRESSURE: 145 MMHG | HEIGHT: 61 IN | WEIGHT: 198 LBS | HEART RATE: 71 BPM | TEMPERATURE: 97.4 F

## 2020-12-22 PROCEDURE — G8400 PT W/DXA NO RESULTS DOC: HCPCS | Performed by: INTERNAL MEDICINE

## 2020-12-22 PROCEDURE — G8484 FLU IMMUNIZE NO ADMIN: HCPCS | Performed by: INTERNAL MEDICINE

## 2020-12-22 PROCEDURE — 3017F COLORECTAL CA SCREEN DOC REV: CPT | Performed by: INTERNAL MEDICINE

## 2020-12-22 PROCEDURE — 1123F ACP DISCUSS/DSCN MKR DOCD: CPT | Performed by: INTERNAL MEDICINE

## 2020-12-22 PROCEDURE — G8427 DOCREV CUR MEDS BY ELIG CLIN: HCPCS | Performed by: INTERNAL MEDICINE

## 2020-12-22 PROCEDURE — 1090F PRES/ABSN URINE INCON ASSESS: CPT | Performed by: INTERNAL MEDICINE

## 2020-12-22 PROCEDURE — G8417 CALC BMI ABV UP PARAM F/U: HCPCS | Performed by: INTERNAL MEDICINE

## 2020-12-22 PROCEDURE — 99214 OFFICE O/P EST MOD 30 MIN: CPT | Performed by: INTERNAL MEDICINE

## 2020-12-22 PROCEDURE — 1036F TOBACCO NON-USER: CPT | Performed by: INTERNAL MEDICINE

## 2020-12-22 PROCEDURE — 4040F PNEUMOC VAC/ADMIN/RCVD: CPT | Performed by: INTERNAL MEDICINE

## 2020-12-22 NOTE — PROGRESS NOTES
[facility]    [address]  [address], [address]  [phone]       Progress Note    12/22/2020 12:54 PM    Pt Name:    Jose Hennessy  YOB: 1950  Primary Care Physician:  Willy Allen MD       Chief Complaint:   Chief Complaint   Patient presents with    Chronic Kidney Disease     Stage 3    Diabetes    Hypertension    Nephropathy    Obesity    Proteinuria        History of Chief Complaint: CKD  Stage G-IIIA-A1 from DM and HTN and biopsy proven secondary FSGS. Subjective:  I last saw the patient in clinic 11/24/2020. I follow the patient for Chronic Kidney disease stage G-IIIB-A1. Since our last visit the patient has not been hospitalized. The patient is sleeping well at night with 1-2 times per night nocturia. The patient has a good appetite and is remaining active. The patient denied N/V/C/D/SOB/CP. She has no trouble at bladder emptying. Her C peptide level was normal (it should be elevated for her due to insulin resistance). This suggests, however, that her pancreas can still make some insulin. COVID-19 screening  Fever: none  Cough: none  Exposure: none  Shortness of breath: none    Protein/creatinine ratio: 1.1  eGFR: 32 Ml/min      Last six eGFR readings:  Lab Results   Component Value Date    LABGLOM 32 12/17/2020    LABGLOM 40 11/19/2020    LABGLOM 30 10/23/2020    LABGLOM 30 10/08/2020    LABGLOM 53 03/09/2020    LABGLOM 48 09/03/2019          Objective:  VITALS:  BP (!) 145/75 (Site: Right Upper Arm, Position: Sitting, Cuff Size: Large Adult)   Pulse 71   Temp 97.4 °F (36.3 °C)   Resp 18   Ht 5' 1\" (1.549 m)   Wt 198 lb (89.8 kg)   SpO2 95%   BMI 37.41 kg/m²   Weight:   Wt Readings from Last 3 Encounters:   12/22/20 198 lb (89.8 kg)   11/24/20 203 lb (92.1 kg)   11/12/20 205 lb (93 kg)     Body mass index is 37.41 kg/m².      Physical examination    General:  Alert and cooperative with exam Kidney disease: CKD stage G-IIIB-A1  Anemia: Anemia remains stable. No need for an erythrocyte stimulating agent (VICTORIANO). Bone and mineral metabolism: There is no complaint of bone pain. PLAN:  1. We discussed the eGFR today. 2. We will continue all current medications without changes. 3. Adding trulicity  4. We will see the patient back in 1 months.       _________________________________  Gordon Senters.  Ayse Riojas DO  [facility]      Siomara Perry MD

## 2020-12-22 NOTE — PATIENT INSTRUCTIONS
KNOW YOUR KIDNEY NUMBERS    Your kidney speed (eGFR) was 32 ml/min this visit (normal is  ml/min)(Ml/min=milliliters of blood filtered per minute). The higher this number is, the better your kidney function is. Your serum creatinine was 1.7 (normal 0.8-1.2 mg/dl at most labs). The higher this number is, the worse your kidney function is. You are in stage G-IIIB-A1 of chronic kidney disease. Your kidney function has Declined as compared to your last visit. Your last eGFR was  40 Ml/Min. Stages of kidney disease    EGFR (estimated glomerular filtration rate)    G-I > 90 ml/min Kidney damage with normal kidney function (blood or protein in the urine)  G-II 60-89 ml/min Normal kidney function with mild damage with or without blood or protein in the urine  G-IIIA 45-59 ml/min Mild to moderate loss of kidney function. G-IIIB 30-44 ml/min Moderate to severe loss of kidney function  G-IV 15-29 ml/min Severe loss of kidney function  G-V < 15 mlmin     May need dialysis or kidney transplant    ACR (urine albumin/creatinine ratio) (Mg/Gm)    A-1      ACR<30 Normal to mildly increased protein in the urine. A-2 ACR  Moderate increase in urine protein loss. A-3 ACR >300 Severe increase in urine protein loss    Our goal is to keep your eGFR going as fast as possible ( ml/min is normal). If your eGFR declines to 15-24 ml/min and stays there without recovery,  we will begin to educate you about dialysis or kidney transplant. We also want to keep the protein in your urine as low as possible. The leading cause of kidney disease in the world is diabetes mellitus. Keep your sugar  as much as possible. The second leading cause is hypertension. Keep Your blood pressure 120-140/70-80 as much as possible. If you need refills, call the office or your drug store. You may call the office any time with any questions or concerns. DUE TO THE CORONAVIRUS CONCERN, PLEASE LIMIT YOUR TIME IN PUBLIC. [address] YOUR HANDS COMPLETELY AND FREQUENTLY. Kemi Long

## 2020-12-24 PROBLEM — J32.9 SINUSITIS: Status: RESOLVED | Noted: 2020-11-24 | Resolved: 2020-12-24

## 2021-01-21 LAB
BASOPHILS ABSOLUTE: 0.03 /ΜL
BASOPHILS RELATIVE PERCENT: 0.6 %
BUN BLDV-MCNC: 26 MG/DL
CALCIUM SERPL-MCNC: 9.3 MG/DL
CHLORIDE BLD-SCNC: 102 MMOL/L
CO2: 28 MMOL/L
CREAT SERPL-MCNC: 1.7 MG/DL
EOSINOPHILS ABSOLUTE: 0.14 /ΜL
EOSINOPHILS RELATIVE PERCENT: 2.8 %
GFR CALCULATED: 32
GLUCOSE BLD-MCNC: 117 MG/DL
HCT VFR BLD CALC: 44.1 % (ref 36–46)
HEMOGLOBIN: 14.5 G/DL (ref 12–16)
LYMPHOCYTES ABSOLUTE: 1.29 /ΜL
LYMPHOCYTES RELATIVE PERCENT: 25.8 %
MCH RBC QN AUTO: 29.1 PG
MCHC RBC AUTO-ENTMCNC: 32.9 G/DL
MCV RBC AUTO: 88.4 FL
MONOCYTES ABSOLUTE: 0.54 /ΜL
MONOCYTES RELATIVE PERCENT: 10.8 %
NEUTROPHILS ABSOLUTE: 2.98 /ΜL
NEUTROPHILS RELATIVE PERCENT: 59.6 %
PLATELET # BLD: 233 K/ΜL
PMV BLD AUTO: 10.8 FL
POTASSIUM SERPL-SCNC: 4.3 MMOL/L
RBC # BLD: 4.99 10^6/ΜL
SODIUM BLD-SCNC: 139 MMOL/L
WBC # BLD: 5 10^3/ML

## 2021-01-26 ENCOUNTER — OFFICE VISIT (OUTPATIENT)
Dept: NEPHROLOGY | Age: 71
End: 2021-01-26
Payer: MEDICARE

## 2021-01-26 VITALS
OXYGEN SATURATION: 97 % | SYSTOLIC BLOOD PRESSURE: 148 MMHG | TEMPERATURE: 97.3 F | RESPIRATION RATE: 18 BRPM | HEART RATE: 89 BPM | BODY MASS INDEX: 37.38 KG/M2 | DIASTOLIC BLOOD PRESSURE: 90 MMHG | HEIGHT: 61 IN | WEIGHT: 198 LBS

## 2021-01-26 DIAGNOSIS — N18.32 STAGE 3B CHRONIC KIDNEY DISEASE (HCC): Primary | ICD-10-CM

## 2021-01-26 PROCEDURE — G8484 FLU IMMUNIZE NO ADMIN: HCPCS | Performed by: INTERNAL MEDICINE

## 2021-01-26 PROCEDURE — G8417 CALC BMI ABV UP PARAM F/U: HCPCS | Performed by: INTERNAL MEDICINE

## 2021-01-26 PROCEDURE — 1123F ACP DISCUSS/DSCN MKR DOCD: CPT | Performed by: INTERNAL MEDICINE

## 2021-01-26 PROCEDURE — 3017F COLORECTAL CA SCREEN DOC REV: CPT | Performed by: INTERNAL MEDICINE

## 2021-01-26 PROCEDURE — 4040F PNEUMOC VAC/ADMIN/RCVD: CPT | Performed by: INTERNAL MEDICINE

## 2021-01-26 PROCEDURE — 99214 OFFICE O/P EST MOD 30 MIN: CPT | Performed by: INTERNAL MEDICINE

## 2021-01-26 PROCEDURE — G8400 PT W/DXA NO RESULTS DOC: HCPCS | Performed by: INTERNAL MEDICINE

## 2021-01-26 PROCEDURE — G8427 DOCREV CUR MEDS BY ELIG CLIN: HCPCS | Performed by: INTERNAL MEDICINE

## 2021-01-26 PROCEDURE — 1036F TOBACCO NON-USER: CPT | Performed by: INTERNAL MEDICINE

## 2021-01-26 PROCEDURE — 1090F PRES/ABSN URINE INCON ASSESS: CPT | Performed by: INTERNAL MEDICINE

## 2021-01-26 NOTE — PATIENT INSTRUCTIONS
DUE TO THE CORONAVIRUS CONCERN, PLEASE LIMIT YOUR TIME IN PUBLIC. [address] YOUR HANDS COMPLETELY AND FREQUENTLY. Tr Boss

## 2021-01-26 NOTE — PROGRESS NOTES
[facility]    [address]  [address], [address]  [phone]       Progress Note    1/26/2021 1:47 PM    Pt Name:    Marlee Hess  YOB: 1950  Primary Care Physician:  Mike Alvarado MD       Chief Complaint:   Chief Complaint   Patient presents with    Chronic Kidney Disease    Anemia    Diabetes    Hypertension    Nephropathy    Obesity    Proteinuria        History of Chief Complaint: CKD  Stage G-IIIB-A1 from DM and HTN and biopsy proven secondary FSGS. Subjective:  I last saw the patient in clinic 12/22/2020. I follow the patient for Chronic Kidney disease stage G-IIIB-A1. Since our last visit the patient has not been hospitalized. The patient is sleeping well at night with 1-2 times per night nocturia. The patient has a good appetite and is remaining active. The patient denied N/V/C/D/SOB/CP. She has no trouble at bladder emptying. She is doing well with Trulicity and is using less insulin and her glucose is better. She was found to have sleep apnea and is using CPAP. COVID-19 screening  Fever: none  Cough: none  Exposure: none  Shortness of breath: none    Protein/creatinine ratio: 0.7  eGFR: 32 Ml/min (it was 32 Ml/Min last visit)      Last six eGFR readings:  Lab Results   Component Value Date    LABGLOM 32 01/21/2021    LABGLOM 32 12/17/2020    LABGLOM 40 11/19/2020    LABGLOM 30 10/23/2020    LABGLOM 30 10/08/2020    LABGLOM 53 03/09/2020          Objective:  VITALS:  BP (!) 148/90 (Site: Right Upper Arm, Position: Sitting, Cuff Size: Small Adult)   Pulse 89   Temp 97.3 °F (36.3 °C)   Resp 18   Ht 5' 1\" (1.549 m)   Wt 198 lb (89.8 kg)   SpO2 97%   BMI 37.41 kg/m²   Weight:   Wt Readings from Last 3 Encounters:   01/26/21 198 lb (89.8 kg)   12/22/20 198 lb (89.8 kg)   11/24/20 203 lb (92.1 kg)     Body mass index is 37.41 kg/m².      Physical examination    General:  Alert and cooperative with exam HEENT:  Normocephalic. No lesions nor rashes. TOÑA. EOMI  Neck:   No JVD and no bruits. Thyroid gland is normal  Lungs:  Breathing easily. No rales nor rhonchi. No cough nor sputum production. Heart[de-identified]            RRR. No murmurs nor rubs. PMI is not enlarged nor displaced. Abdomen:  Soft and non tender. Bowel sounds are active in all four quadrants. Extremities:  1+ edema  Neurologic:  CN II-XII are intact. No deficits noted. Muscle strength and tone are equal throughout. Skin:                Warm and dry with no rashes. Muscles:         Hand  and leg strength are equal and strong bilaterally.      Lab Data      CBC:   Lab Results   Component Value Date    WBC 5.00 01/21/2021    HGB 14.5 01/21/2021    HCT 44.1 01/21/2021    MCV 88.4 01/21/2021     01/21/2021     BMP:    Lab Results   Component Value Date     01/21/2021     12/17/2020     11/19/2020    K 4.3 01/21/2021    K 4.0 12/17/2020    K 4.1 11/19/2020     01/21/2021     12/17/2020     11/19/2020    CO2 28 01/21/2021    CO2 30 12/17/2020    CO2 29 11/19/2020    BUN 26 01/21/2021    BUN 32 12/17/2020    BUN 29 11/19/2020    CREATININE 1.7 01/21/2021    CREATININE 1.7 12/17/2020    CREATININE 1.4 11/19/2020    GLUCOSE 117 01/21/2021    GLUCOSE 114 12/17/2020    GLUCOSE 144 11/19/2020      Hepatic:   Lab Results   Component Value Date    AST 26 11/19/2020    AST 27 01/08/2019    AST 19 05/12/2018    ALT 42 11/19/2020    ALT 17 01/08/2019    ALT 30 05/12/2018    BILITOT 0.2 11/19/2020    BILITOT 0.3 01/08/2019    BILITOT 0.2 05/12/2018    ALKPHOS 90 11/19/2020    ALKPHOS 59 01/08/2019    ALKPHOS 67 05/12/2018     BNP: No results found for: BNP  Lipids:   Lab Results   Component Value Date    CHOL 218 03/09/2020    HDL 29 (A) 03/09/2020     INR: No results found for: INR  URINE: No results found for: Brant Paredes No results found for: Pedro Christiano, PHUR, LABCAST, WBCUA, RBCUA, MUCUS, TRICHOMONAS, YEAST, BACTERIA, CLARITYU, SPECGRAV, LEUKOCYTESUR, UROBILINOGEN, BILIRUBINUR, BLOODU, GLUCOSEU, KETUA, AMORPHOUS   Microalbumen/Creatinine ratio:  No components found for: RUCREAT        Medications:    Current Outpatient Medications   Medication Sig Dispense Refill    Dulaglutide 0.75 MG/0.5ML SOPN Inject 0.75 mg into the skin once a week 4 pen 1    aspirin 325 MG tablet 325 mg      Insulin NPH Human, Isophane, (NOVOLIN N SC) 25 Units      omeprazole 20 MG EC tablet daily      ketotifen (ZADITOR) 0.025 % ophthalmic solution Ketotifen Ketotifen Fumarate (Eye Itch Relief) 0.025 % (0.035 %) drops Active 1 DRP OP TWICE DAILY 5 May 12th, 2020 2:19pm administer at least 8 hours apart 05-  [facility] ([id number])      amoxicillin (AMOXIL) 875 MG tablet       PARoxetine (PAXIL) 20 MG tablet 20 mg daily      irbesartan (AVAPRO) 150 MG tablet 150 mg daily      traZODone (DESYREL) 50 MG tablet 50 mg as needed      atorvastatin (LIPITOR) 80 MG tablet 80 mg every evening      valsartan-hydrochlorothiazide (DIOVAN-HCT) 320-25 MG per tablet       atenolol (TENORMIN) 25 MG tablet 25 mg daily      amLODIPine (NORVASC) 10 MG tablet 10 mg daily      busPIRone (BUSPAR) 10 MG tablet 10 mg daily      fenofibrate 160 MG tablet 160 mg daily      gabapentin (NEURONTIN) 100 MG capsule 100 mg 3 times daily      INSULIN REGULAR HUMAN IJ Inject 10 Units as directed Plus sliding scale      insulin NPH (HUMULIN N;NOVOLIN N) 100 UNIT/ML injection vial Inject 50 Units into the skin nightly      Multiple Vitamins-Minerals (THERAPEUTIC MULTIVITAMIN-MINERALS) tablet Take 1 tablet by mouth daily.  ranitidine (ZANTAC) 150 MG capsule Take 150 mg by mouth 2 times daily.  levothyroxine (SYNTHROID) 50 MCG tablet Take 50 mcg by mouth Daily. No current facility-administered medications for this visit. IMPRESSIONS:        Kidney disease: CKD stage G-IIIB-A1  Anemia: Anemia remains stable. No need for an erythrocyte stimulating agent (VICTORIANO). Bone and mineral metabolism: There is no complaint of bone pain. PLAN:  1. We discussed the eGFR today. 2. We will continue all current medications without changes. 3. Increase trulicity to 1.5 mg every 7 days. 4. We will see the patient back in 4 months.       _________________________________  Shae Vazquez.  Favian Miramontes DO  [facility]      Danny Gee MD

## 2021-04-26 ENCOUNTER — TELEPHONE (OUTPATIENT)
Dept: NEPHROLOGY | Age: 71
End: 2021-04-26

## 2021-04-26 NOTE — TELEPHONE ENCOUNTER
Patient phoned since the increase in trulicity on 2-40 she has been having severe diarrhea - should she lower the dose and see how that goes?

## 2021-04-27 NOTE — TELEPHONE ENCOUNTER
This is a common occurrence. Tell her to wait it out. Notify us if the diarrhea does not resolve. She can use Immodium to treat the diarrhea.   Destiny Downs

## 2021-05-18 LAB
BASOPHILS ABSOLUTE: NORMAL
BASOPHILS RELATIVE PERCENT: NORMAL
BUN BLDV-MCNC: 30 MG/DL
CALCIUM SERPL-MCNC: 9.7 MG/DL
CHLORIDE BLD-SCNC: 102 MMOL/L
CO2: 30 MMOL/L
CREAT SERPL-MCNC: 1.5 MG/DL
EOSINOPHILS ABSOLUTE: NORMAL
EOSINOPHILS RELATIVE PERCENT: NORMAL
GFR CALCULATED: 37
GLUCOSE BLD-MCNC: 151 MG/DL
HCT VFR BLD CALC: 42 % (ref 36–46)
HEMOGLOBIN: 13.4 G/DL (ref 12–16)
LYMPHOCYTES ABSOLUTE: NORMAL
LYMPHOCYTES RELATIVE PERCENT: NORMAL
MCH RBC QN AUTO: NORMAL PG
MCHC RBC AUTO-ENTMCNC: NORMAL G/DL
MCV RBC AUTO: NORMAL FL
MONOCYTES ABSOLUTE: NORMAL
MONOCYTES RELATIVE PERCENT: NORMAL
NEUTROPHILS ABSOLUTE: NORMAL
NEUTROPHILS RELATIVE PERCENT: NORMAL
PLATELET # BLD: 294 K/ΜL
PMV BLD AUTO: NORMAL FL
POTASSIUM SERPL-SCNC: 4.7 MMOL/L
RBC # BLD: 4.56 10^6/ΜL
SODIUM BLD-SCNC: 139 MMOL/L
WBC # BLD: 5.12 10^3/ML

## 2021-05-25 ENCOUNTER — OFFICE VISIT (OUTPATIENT)
Dept: NEPHROLOGY | Age: 71
End: 2021-05-25
Payer: MEDICARE

## 2021-05-25 VITALS
BODY MASS INDEX: 37.19 KG/M2 | HEIGHT: 61 IN | DIASTOLIC BLOOD PRESSURE: 78 MMHG | OXYGEN SATURATION: 96 % | HEART RATE: 91 BPM | WEIGHT: 197 LBS | SYSTOLIC BLOOD PRESSURE: 108 MMHG | RESPIRATION RATE: 18 BRPM

## 2021-05-25 DIAGNOSIS — N18.32 STAGE 3B CHRONIC KIDNEY DISEASE (HCC): Primary | ICD-10-CM

## 2021-05-25 PROCEDURE — 4040F PNEUMOC VAC/ADMIN/RCVD: CPT | Performed by: INTERNAL MEDICINE

## 2021-05-25 PROCEDURE — G8427 DOCREV CUR MEDS BY ELIG CLIN: HCPCS | Performed by: INTERNAL MEDICINE

## 2021-05-25 PROCEDURE — G8417 CALC BMI ABV UP PARAM F/U: HCPCS | Performed by: INTERNAL MEDICINE

## 2021-05-25 PROCEDURE — G8400 PT W/DXA NO RESULTS DOC: HCPCS | Performed by: INTERNAL MEDICINE

## 2021-05-25 PROCEDURE — 1090F PRES/ABSN URINE INCON ASSESS: CPT | Performed by: INTERNAL MEDICINE

## 2021-05-25 PROCEDURE — 3017F COLORECTAL CA SCREEN DOC REV: CPT | Performed by: INTERNAL MEDICINE

## 2021-05-25 PROCEDURE — 1123F ACP DISCUSS/DSCN MKR DOCD: CPT | Performed by: INTERNAL MEDICINE

## 2021-05-25 PROCEDURE — 1036F TOBACCO NON-USER: CPT | Performed by: INTERNAL MEDICINE

## 2021-05-25 PROCEDURE — 99214 OFFICE O/P EST MOD 30 MIN: CPT | Performed by: INTERNAL MEDICINE

## 2021-05-25 RX ORDER — FLASH GLUCOSE SENSOR
1 KIT MISCELLANEOUS
Qty: 2 EACH | Refills: 12 | Status: SHIPPED | OUTPATIENT
Start: 2021-05-25 | End: 2022-01-14

## 2021-05-25 RX ORDER — FLASH GLUCOSE SCANNING READER
EACH MISCELLANEOUS
Qty: 1 EACH | Refills: 12 | Status: SHIPPED | OUTPATIENT
Start: 2021-05-25 | End: 2022-01-14

## 2021-05-25 NOTE — TELEPHONE ENCOUNTER
THUY [facility] number [phone]. I seen where Dr. Bernnen Douglas sent this to [facility]. I am working on it. thank you.

## 2021-05-25 NOTE — PROGRESS NOTES
Breathing easily. No rales nor rhonchi. No cough nor sputum production. Heart[de-identified]            RRR. No murmurs nor rubs. PMI is not enlarged nor displaced. Abdomen:  Soft and non tender. Bowel sounds are active in all four quadrants. Extremities:  no edema  Neurologic:  CN II-XII are intact. No deficits noted. Muscle strength and tone are equal throughout. Skin:                Warm and dry with no rashes. Muscles:         Hand  and leg strength are equal and strong bilaterally.      Lab Data      CBC:   Lab Results   Component Value Date    WBC 5.12 05/18/2021    HGB 13.4 05/18/2021    HCT 42.0 05/18/2021    MCV 88.4 01/21/2021     05/18/2021     BMP:    Lab Results   Component Value Date     05/18/2021     01/21/2021     12/17/2020    K 4.7 05/18/2021    K 4.3 01/21/2021    K 4.0 12/17/2020     05/18/2021     01/21/2021     12/17/2020    CO2 30 05/18/2021    CO2 28 01/21/2021    CO2 30 12/17/2020    BUN 30 05/18/2021    BUN 26 01/21/2021    BUN 32 12/17/2020    CREATININE 1.5 05/18/2021    CREATININE 1.7 01/21/2021    CREATININE 1.7 12/17/2020    GLUCOSE 151 05/18/2021    GLUCOSE 117 01/21/2021    GLUCOSE 114 12/17/2020      Hepatic:   Lab Results   Component Value Date    AST 26 11/19/2020    AST 27 01/08/2019    AST 19 05/12/2018    ALT 42 11/19/2020    ALT 17 01/08/2019    ALT 30 05/12/2018    BILITOT 0.2 11/19/2020    BILITOT 0.3 01/08/2019    BILITOT 0.2 05/12/2018    ALKPHOS 90 11/19/2020    ALKPHOS 59 01/08/2019    ALKPHOS 67 05/12/2018     BNP: No results found for: BNP  Lipids:   Lab Results   Component Value Date    CHOL 218 03/09/2020    HDL 29 (A) 03/09/2020     INR: No results found for: INR  URINE: No results found for: Jennifer Donath  No results found for: Isela Mormonism, PHUR, LABCAST, [address], RBCUA, MUCUS, TRICHOMONAS, YEAST, BACTERIA, CLARITYU, [address], [address] [facility], [address], BILIRUBINUR, BLOODU, GLUCOSEU, Joseph Hanna, AMORPHOUS Microalbumen/Creatinine ratio:  No components found for: RUCREAT        Medications:    Current Outpatient Medications   Medication Sig Dispense Refill    Dulaglutide 1.5 MG/0.5ML SOPN Inject 1.5 mg into the skin once a week 4 pen 12    aspirin 325 MG tablet 325 mg      Insulin NPH Human, Isophane, (NOVOLIN N SC) 25 Units      omeprazole 20 MG EC tablet daily      ketotifen (ZADITOR) 0.025 % ophthalmic solution Ketotifen Ketotifen Fumarate (Eye Itch Relief) 0.025 % (0.035 %) drops Active 1 DRP OP TWICE DAILY 5 May 12th, 2020 2:19pm administer at least 8 hours apart 05-  [facility] THE [facility] ([id number])      PARoxetine (PAXIL) 20 MG tablet 20 mg daily      irbesartan (AVAPRO) 150 MG tablet 150 mg daily      traZODone (DESYREL) 50 MG tablet 50 mg as needed      atorvastatin (LIPITOR) 80 MG tablet 80 mg every evening      valsartan-hydrochlorothiazide (DIOVAN-HCT) 320-25 MG per tablet       atenolol (TENORMIN) 25 MG tablet 25 mg daily      amLODIPine (NORVASC) 10 MG tablet 10 mg daily      busPIRone (BUSPAR) 10 MG tablet 10 mg daily      fenofibrate 160 MG tablet 160 mg daily      gabapentin (NEURONTIN) 300 MG capsule 300 mg 3 times daily.  INSULIN REGULAR HUMAN IJ Inject 10 Units as directed Plus sliding scale      insulin NPH (HUMULIN N;NOVOLIN N) 100 UNIT/ML injection vial Inject 50 Units into the skin nightly      Multiple Vitamins-Minerals (THERAPEUTIC MULTIVITAMIN-MINERALS) tablet Take 1 tablet by mouth daily.  ranitidine (ZANTAC) 150 MG capsule Take 150 mg by mouth 2 times daily.  levothyroxine (SYNTHROID) 50 MCG tablet Take 50 mcg by mouth Daily. No current facility-administered medications for this visit. IMPRESSIONS:        Kidney disease: CKD stage G-IIIB-A1  Anemia: Anemia remains stable. No need for an erythrocyte stimulating agent (VICTORIANO). Bone and mineral metabolism: There is no complaint of bone pain. PLAN:  1. We discussed the eGFR today.

## 2021-05-25 NOTE — PATIENT INSTRUCTIONS

## 2021-05-26 ENCOUNTER — OFFICE VISIT (OUTPATIENT)
Dept: PULMONOLOGY | Age: 71
End: 2021-05-26
Payer: MEDICARE

## 2021-05-26 VITALS
HEART RATE: 90 BPM | HEIGHT: 61 IN | TEMPERATURE: 97.8 F | WEIGHT: 202 LBS | SYSTOLIC BLOOD PRESSURE: 132 MMHG | DIASTOLIC BLOOD PRESSURE: 88 MMHG | OXYGEN SATURATION: 97 % | BODY MASS INDEX: 38.14 KG/M2

## 2021-05-26 DIAGNOSIS — E66.01 MORBID OBESITY (HCC): ICD-10-CM

## 2021-05-26 DIAGNOSIS — I10 ESSENTIAL HYPERTENSION: ICD-10-CM

## 2021-05-26 DIAGNOSIS — Z99.89 OSA ON CPAP: Primary | ICD-10-CM

## 2021-05-26 DIAGNOSIS — N18.9 CHRONIC KIDNEY DISEASE, UNSPECIFIED CKD STAGE: ICD-10-CM

## 2021-05-26 DIAGNOSIS — G47.33 OSA ON CPAP: Primary | ICD-10-CM

## 2021-05-26 DIAGNOSIS — K21.9 GASTROESOPHAGEAL REFLUX DISEASE WITHOUT ESOPHAGITIS: ICD-10-CM

## 2021-05-26 PROCEDURE — 1036F TOBACCO NON-USER: CPT | Performed by: NURSE PRACTITIONER

## 2021-05-26 PROCEDURE — G8427 DOCREV CUR MEDS BY ELIG CLIN: HCPCS | Performed by: NURSE PRACTITIONER

## 2021-05-26 PROCEDURE — 1123F ACP DISCUSS/DSCN MKR DOCD: CPT | Performed by: NURSE PRACTITIONER

## 2021-05-26 PROCEDURE — G8417 CALC BMI ABV UP PARAM F/U: HCPCS | Performed by: NURSE PRACTITIONER

## 2021-05-26 PROCEDURE — 99213 OFFICE O/P EST LOW 20 MIN: CPT | Performed by: NURSE PRACTITIONER

## 2021-05-26 PROCEDURE — 1090F PRES/ABSN URINE INCON ASSESS: CPT | Performed by: NURSE PRACTITIONER

## 2021-05-26 PROCEDURE — 4040F PNEUMOC VAC/ADMIN/RCVD: CPT | Performed by: NURSE PRACTITIONER

## 2021-05-26 PROCEDURE — G8400 PT W/DXA NO RESULTS DOC: HCPCS | Performed by: NURSE PRACTITIONER

## 2021-05-26 PROCEDURE — 3017F COLORECTAL CA SCREEN DOC REV: CPT | Performed by: NURSE PRACTITIONER

## 2021-05-26 RX ORDER — DULAGLUTIDE 3 MG/.5ML
3 INJECTION, SOLUTION SUBCUTANEOUS WEEKLY
Qty: 12 PEN | Refills: 3 | Status: SHIPPED | OUTPATIENT
Start: 2021-05-26

## 2021-05-26 ASSESSMENT — ENCOUNTER SYMPTOMS
VOMITING: 0
EYES NEGATIVE: 1
SHORTNESS OF BREATH: 0
COUGH: 0
NAUSEA: 0
DIARRHEA: 0
WHEEZING: 0
ABDOMINAL PAIN: 0

## 2021-05-26 NOTE — PROGRESS NOTES
[facility]      Maricarmen Dale         [phone]  5/26/2021   Chief Complaint   Patient presents with    Follow-up     MOISES  6-8 week follow up from [place] night        Pt of Dr. Lucero Gillis     PAP Download:   Leonor Bernardo or initial AHI: 79.6     Date of initial study: 11/23/2020      Compliant  100%     Noncompliant 0 %     PAP Type aircurve 10 Level  4/25 cmh2o    Avg Hrs/Day 8:34  AHI: 2.5   Recorded compliance dates ,4/24/2021-5/23/2021  Machine/Mfg:   [x] ResMed    [] Respironics/Dreamstation   Interface:   [] Nasal    [] Nasal pillows   [x] FFM      Provider:      [] [facility]     [][facility]     [] [employer]    [x] [address]    [] [place]               [] [facility]      [] [employer]    [] [address]      [] Other    Neck Size: 25  Mallampati Mallampati 4  ESS:  0  SAQLI: 93  Here is a scan of the most recent download:            Presentation:   Griselda Mcgarry presents for sleep medicine follow up for obstructive sleep apnea  Since the last visit, Ananda MEDINA newly set up on CPAP , feels great. Can not believe how much better she feels, snoring is resolved. More energy during the day   Started with large mask now in small mask and feels it fits great. Has truly been \" night and day\"   Continues to take Melatonin to help sleep   Underlying GERD, under good control    Equipment issues: The pressure is  acceptable, the mask is acceptable     Sleep issues:  Do you feel better? Yes  More rested? Yes   Better concentration? no    Progress History:   Since last visit any new medical issues? No  New ER or hospital visits? No  Any new or changes in medicines? Yes- started on Trulicity from her nephrologist   Any new sleep medicines? No    Review of Systems -   Review of Systems   Constitutional: Negative for activity change, appetite change, chills and fever. HENT: Negative. Eyes: Negative. Respiratory: Negative for cough, shortness of breath and wheezing.     Cardiovascular: Negative for chest pain, palpitations and leg swelling. Gastrointestinal: Negative for abdominal pain, diarrhea, nausea and vomiting. Genitourinary: Negative. Musculoskeletal: Negative. Skin: Negative. Neurological: Negative. Hematological: Does not bruise/bleed easily. Psychiatric/Behavioral: Negative for suicidal ideas. Physical Exam:    BMI:  Body mass index is 38.17 kg/m². Wt Readings from Last 3 Encounters:   05/26/21 202 lb (91.6 kg)   05/25/21 197 lb (89.4 kg)   01/26/21 198 lb (89.8 kg)     Weight stable / unchanged  Vitals: /88 (Site: Left Upper Arm, Position: Sitting, Cuff Size: Medium Adult)   Pulse 90   Temp 97.8 °F (36.6 °C)   Ht 5' 1\" (1.549 m)   Wt 202 lb (91.6 kg)   SpO2 97% Comment: on ra  BMI 38.17 kg/m²       Physical Exam  Constitutional:       Appearance: Normal appearance. HENT:      Head: Normocephalic and atraumatic. Eyes:      Conjunctiva/sclera: Conjunctivae normal.   Pulmonary:      Effort: No tachypnea, bradypnea or respiratory distress. Neurological:      Mental Status: She is alert and oriented to person, place, and time. Psychiatric:         Attention and Perception: Attention normal.         Mood and Affect: Mood normal.         Speech: Speech normal.         Behavior: Behavior normal.         Thought Content: Thought content normal.         Cognition and Memory: Cognition normal.         Judgment: Judgment normal.         ASSESSMENT/DIAGNOSIS     Diagnosis Orders   1. MOISES on CPAP     2. Essential hypertension     3. Morbid obesity ([address]     4. Chronic kidney disease, unspecified CKD stage     5. Gastroesophageal reflux disease without esophagitis              Plan   Do you need any equipment today? No  - Download reviewed and discussed with patient  - She  was advised to continue current positive airway pressure therapy with above described pressure.    - She  advised to keep good compliance with current recommended pressure to get optimal results and clinical improvement  - Recommend 7-9 hours of sleep with PAP  - She was advised to call [facility] regarding supplies if needed.   -She call my office for earlier appointment if needed for worsening of sleep symptoms.   - She was instructed on weight loss  - Laurazina John was educated about my impression and plan. Patient verbalizesunderstanding.     We will see Keely Turner back in: 1 year with download    Information added by my medical assistant/LPN was reviewed today    Total time on encounter 20 min   Electronically signed by SHARITA Brooke CNP on 5/26/2021 at 9:21 AM

## 2021-10-08 LAB
BASOPHILS ABSOLUTE: NORMAL
BASOPHILS RELATIVE PERCENT: NORMAL
BUN BLDV-MCNC: 31 MG/DL
CALCIUM SERPL-MCNC: 9.5 MG/DL
CHLORIDE BLD-SCNC: 103 MMOL/L
CO2: 29 MMOL/L
CREAT SERPL-MCNC: 1.8 MG/DL
CREATININE, URINE: 222.2
EOSINOPHILS ABSOLUTE: NORMAL
EOSINOPHILS RELATIVE PERCENT: NORMAL
GFR CALCULATED: 30
GLUCOSE BLD-MCNC: 163 MG/DL
HCT VFR BLD CALC: 43.3 % (ref 36–46)
HEMOGLOBIN: 13.8 G/DL (ref 12–16)
LYMPHOCYTES ABSOLUTE: NORMAL
LYMPHOCYTES RELATIVE PERCENT: NORMAL
MCH RBC QN AUTO: NORMAL PG
MCHC RBC AUTO-ENTMCNC: NORMAL G/DL
MCV RBC AUTO: NORMAL FL
MICROALBUMIN/CREAT 24H UR: 658.8 MG/G{CREAT}
MICROALBUMIN/CREAT UR-RTO: 296.5
MONOCYTES ABSOLUTE: NORMAL
MONOCYTES RELATIVE PERCENT: NORMAL
NEUTROPHILS ABSOLUTE: NORMAL
NEUTROPHILS RELATIVE PERCENT: NORMAL
PLATELET # BLD: 239 K/ΜL
PMV BLD AUTO: NORMAL FL
POTASSIUM SERPL-SCNC: 4.3 MMOL/L
RBC # BLD: 4.81 10^6/ΜL
SODIUM BLD-SCNC: 139 MMOL/L
WBC # BLD: 5.5 10^3/ML

## 2021-10-15 ENCOUNTER — OFFICE VISIT (OUTPATIENT)
Dept: NEPHROLOGY | Age: 71
End: 2021-10-15
Payer: MEDICARE

## 2021-10-15 VITALS
OXYGEN SATURATION: 96 % | DIASTOLIC BLOOD PRESSURE: 57 MMHG | WEIGHT: 199 LBS | BODY MASS INDEX: 37.57 KG/M2 | RESPIRATION RATE: 18 BRPM | SYSTOLIC BLOOD PRESSURE: 106 MMHG | HEIGHT: 61 IN | HEART RATE: 90 BPM

## 2021-10-15 DIAGNOSIS — N18.32 STAGE 3B CHRONIC KIDNEY DISEASE (HCC): Primary | ICD-10-CM

## 2021-10-15 PROCEDURE — G8417 CALC BMI ABV UP PARAM F/U: HCPCS | Performed by: INTERNAL MEDICINE

## 2021-10-15 PROCEDURE — 1090F PRES/ABSN URINE INCON ASSESS: CPT | Performed by: INTERNAL MEDICINE

## 2021-10-15 PROCEDURE — 1123F ACP DISCUSS/DSCN MKR DOCD: CPT | Performed by: INTERNAL MEDICINE

## 2021-10-15 PROCEDURE — 1036F TOBACCO NON-USER: CPT | Performed by: INTERNAL MEDICINE

## 2021-10-15 PROCEDURE — G8400 PT W/DXA NO RESULTS DOC: HCPCS | Performed by: INTERNAL MEDICINE

## 2021-10-15 PROCEDURE — G8484 FLU IMMUNIZE NO ADMIN: HCPCS | Performed by: INTERNAL MEDICINE

## 2021-10-15 PROCEDURE — 3017F COLORECTAL CA SCREEN DOC REV: CPT | Performed by: INTERNAL MEDICINE

## 2021-10-15 PROCEDURE — 4040F PNEUMOC VAC/ADMIN/RCVD: CPT | Performed by: INTERNAL MEDICINE

## 2021-10-15 PROCEDURE — 99214 OFFICE O/P EST MOD 30 MIN: CPT | Performed by: INTERNAL MEDICINE

## 2021-10-15 PROCEDURE — G8427 DOCREV CUR MEDS BY ELIG CLIN: HCPCS | Performed by: INTERNAL MEDICINE

## 2021-10-15 NOTE — PATIENT INSTRUCTIONS
KNOW YOUR KIDNEY NUMBERS    Your kidney speed (eGFR) was 30 ml/min this visit (normal is  ml/min)(Ml/min=milliliters of blood filtered per minute). The higher this number is, the better your kidney function is. Your serum creatinine was 1.8 (normal 0.8-1.2 mg/dl at most labs). The higher this number is, the worse your kidney function is. You are in stage G-IIIB-A1 of chronic kidney disease. Your kidney function has declined as compared to your last visit. Your last eGFR was  37 Ml/Min. Stages of kidney disease  EGFR (estimated glomerular filtration rate)  G-I > 90 ml/min Kidney damage with normal kidney function (blood or protein in the urine)  G-II 60-89 ml/min Normal kidney function with mild damage with or without blood or protein in the urine  G-IIIA 45-59 ml/min Mild to moderate loss of kidney function. G-IIIB 30-44 ml/min Moderate to severe loss of kidney function  G-IV 15-29 ml/min Severe loss of kidney function  G-V < 15 mlmin     May need dialysis or kidney transplant    ACR (urine albumin/creatinine ratio) (Mg/Gm)  A-1      ACR<30 Normal to mildly increased protein in the urine. A-2 ACR  Moderate increase in urine protein loss. A-3 ACR >300 Severe increase in urine protein loss    Our goal is to keep your eGFR going as fast as possible ( ml/min is normal). If your eGFR declines to 15-24 ml/min and stays there without recovery,  we will begin to educate you about dialysis or kidney transplant. We also want to keep the protein in your urine as low as possible. The leading cause of kidney disease in the world is diabetes mellitus. Keep your sugar  as much as possible. The second leading cause is hypertension. Keep Your blood pressure 120-140/70-80 as much as possible. If you need refills, call the office or your drug store. You may call the office any time with any questions or concerns. We use Epic software to manage your private patient data securely.  Any health care provider or hospital in the world that uses Epic software can see your data if they have the appropriate credentials. DUE TO THE CORONAVIRUS CONCERN, PLEASE LIMIT YOUR TIME IN PUBLIC. 8 Lluvia Valerioidi YOUR HANDS COMPLETELY AND FREQUENTLY. Ryann Hood

## 2021-10-15 NOTE — PROGRESS NOTES
[facility]    [address]  [address], [address]  [phone]       Progress Note    10/15/2021 12:15 PM    Pt Name:    Shanda Escobar  YOB: 1950  Primary Care Physician:  Aristides Taylor MD       Chief Complaint:   Chief Complaint   Patient presents with    Chronic Kidney Disease    Diabetes    Hypertension    Nephropathy    Obesity    Proteinuria        History of Chief Complaint: CKD  Stage G-IIIB-A1 from DM and HTN and biopsy proven secondary FSGS. Subjective:  I last saw the patient in clinic 05/25/2021. I follow the patient for Chronic Kidney disease stage G-IIIB-A1. Since our last visit the patient has not been hospitalized. The patient is sleeping well at night with 1-2 times per night nocturia. The patient has a good appetite and is remaining active. The patient denied N/V/C/D/SOB/CP. She has no trouble at bladder emptying. She feels short of breath and her eGFR got worse. Suspect cardiac pump failure. I will consult Dr. Nirmala Morley and spoke with him. COVID-19 screening  Fever: none  Cough: none  Exposure: none  Shortness of breath: none    Micro albumin/creatinine ratio: 297  eGFR: 30 Ml/min (it was 37 Ml/Min last visit)  SCr: 1.8 Mg/Dl (It was 1.5 Mg/Dl last visit)      Last six eGFR readings:  Lab Results   Component Value Date    LABGLOM 30 10/08/2021    LABGLOM 37 05/18/2021    LABGLOM 32 01/21/2021    LABGLOM 32 12/17/2020    LABGLOM 40 11/19/2020    LABGLOM 30 10/23/2020          Objective:  VITALS:  BP (!) 106/57 (Site: Right Upper Arm, Position: Sitting, Cuff Size: Large Adult)   Pulse 90   Resp 18   Ht 5' 1\" (1.549 m)   Wt 199 lb (90.3 kg)   SpO2 96%   BMI 37.60 kg/m²   Weight:   Wt Readings from Last 3 Encounters:   10/15/21 199 lb (90.3 kg)   05/26/21 202 lb (91.6 kg)   05/25/21 197 lb (89.4 kg)     Body mass index is 37.6 kg/m². Physical examination    General:  Alert and cooperative with exam  HEENT:  Normocephalic. LEUKOCYTESUR, UROBILINOGEN, BILIRUBINUR, BLOODU, GLUCOSEU, KETUA, AMORPHOUS   Microalbumen/Creatinine ratio:  No components found for: RUCREAT        Medications:    Current Outpatient Medications   Medication Sig Dispense Refill    Dulaglutide (TRULICITY) 3 UU/1.1DP SOPN Inject 3 mg into the skin once a week 12 pen 3    Continuous Blood Gluc Sensor (FREESTYLE JASON 14 DAY SENSOR) MISC 1 each by Does not apply route every 14 days 2 each 12    Continuous Blood Gluc  (FREESTYLE JASON 2 READER) PAYTON Test 4 times daily. 1 each 12    aspirin 325 MG tablet 325 mg      Insulin NPH Human, Isophane, (NOVOLIN N SC) 25 Units      omeprazole 20 MG EC tablet daily      ketotifen (ZADITOR) 0.025 % ophthalmic solution Ketotifen Ketotifen Fumarate (Eye Itch Relief) 0.025 % (0.035 %) drops Active 1 DRP OP TWICE DAILY 5 May 12th, 2020 2:19pm administer at least 8 hours apart 05-  [facility] THE [facility] ([id number])      PARoxetine (PAXIL) 20 MG tablet 20 mg daily      irbesartan (AVAPRO) 150 MG tablet 150 mg daily      traZODone (DESYREL) 50 MG tablet 50 mg as needed      atorvastatin (LIPITOR) 80 MG tablet 80 mg every evening      valsartan-hydrochlorothiazide (DIOVAN-HCT) 320-25 MG per tablet       atenolol (TENORMIN) 25 MG tablet 25 mg daily      amLODIPine (NORVASC) 10 MG tablet 10 mg daily      busPIRone (BUSPAR) 10 MG tablet 10 mg daily      fenofibrate 160 MG tablet 160 mg daily      gabapentin (NEURONTIN) 300 MG capsule 300 mg 3 times daily.  INSULIN REGULAR HUMAN IJ Inject 10 Units as directed Plus sliding scale      insulin NPH (HUMULIN N;NOVOLIN N) 100 UNIT/ML injection vial Inject 50 Units into the skin nightly      Multiple Vitamins-Minerals (THERAPEUTIC MULTIVITAMIN-MINERALS) tablet Take 1 tablet by mouth daily.  ranitidine (ZANTAC) 150 MG capsule Take 150 mg by mouth 2 times daily.  levothyroxine (SYNTHROID) 50 MCG tablet Take 50 mcg by mouth Daily.        No current

## 2021-11-12 LAB
ALBUMIN SERPL-MCNC: 3.9 G/DL
ALP BLD-CCNC: 67 U/L
ALT SERPL-CCNC: 29 U/L
ANION GAP SERPL CALCULATED.3IONS-SCNC: 10 MMOL/L
AST SERPL-CCNC: 24 U/L
BASOPHILS ABSOLUTE: NORMAL
BASOPHILS RELATIVE PERCENT: NORMAL
BILIRUB SERPL-MCNC: 0.2 MG/DL (ref 0.1–1.4)
BUN BLDV-MCNC: 45 MG/DL
CALCIUM SERPL-MCNC: 9.5 MG/DL
CHLORIDE BLD-SCNC: 100 MMOL/L
CHOLESTEROL, TOTAL: 190 MG/DL
CHOLESTEROL/HDL RATIO: ABNORMAL
CO2: 31 MMOL/L
CREAT SERPL-MCNC: 2.1 MG/DL
EOSINOPHILS ABSOLUTE: NORMAL
EOSINOPHILS RELATIVE PERCENT: NORMAL
GFR CALCULATED: 25
GLUCOSE BLD-MCNC: 185 MG/DL
HCT VFR BLD CALC: 42.1 % (ref 36–46)
HDLC SERPL-MCNC: 19 MG/DL (ref 35–70)
HEMOGLOBIN: 13.6 G/DL (ref 12–16)
LDL CHOLESTEROL CALCULATED: 88 MG/DL (ref 0–160)
LYMPHOCYTES ABSOLUTE: NORMAL
LYMPHOCYTES RELATIVE PERCENT: NORMAL
MCH RBC QN AUTO: NORMAL PG
MCHC RBC AUTO-ENTMCNC: NORMAL G/DL
MCV RBC AUTO: NORMAL FL
MONOCYTES ABSOLUTE: NORMAL
MONOCYTES RELATIVE PERCENT: NORMAL
NEUTROPHILS ABSOLUTE: NORMAL
NEUTROPHILS RELATIVE PERCENT: NORMAL
NONHDLC SERPL-MCNC: ABNORMAL MG/DL
PLATELET # BLD: 265 K/ΜL
PMV BLD AUTO: NORMAL FL
POTASSIUM SERPL-SCNC: 4.1 MMOL/L
RBC # BLD: 4.59 10^6/ΜL
SODIUM BLD-SCNC: 137 MMOL/L
TOTAL PROTEIN: 7.6
TRIGL SERPL-MCNC: 416 MG/DL
VLDLC SERPL CALC-MCNC: 83 MG/DL
WBC # BLD: 5.88 10^3/ML

## 2021-12-10 LAB
BASOPHILS ABSOLUTE: NORMAL
BASOPHILS RELATIVE PERCENT: NORMAL
BUN BLDV-MCNC: 49 MG/DL
CALCIUM SERPL-MCNC: 9 MG/DL
CHLORIDE BLD-SCNC: 102 MMOL/L
CO2: 29 MMOL/L
CREAT SERPL-MCNC: 2.2 MG/DL
EOSINOPHILS ABSOLUTE: NORMAL
EOSINOPHILS RELATIVE PERCENT: NORMAL
GFR CALCULATED: 24
GLUCOSE BLD-MCNC: 327 MG/DL
HCT VFR BLD CALC: 40.4 % (ref 36–46)
HEMOGLOBIN: 12.9 G/DL (ref 12–16)
LYMPHOCYTES ABSOLUTE: NORMAL
LYMPHOCYTES RELATIVE PERCENT: NORMAL
MCH RBC QN AUTO: NORMAL PG
MCHC RBC AUTO-ENTMCNC: NORMAL G/DL
MCV RBC AUTO: NORMAL FL
MONOCYTES ABSOLUTE: NORMAL
MONOCYTES RELATIVE PERCENT: NORMAL
NEUTROPHILS ABSOLUTE: NORMAL
NEUTROPHILS RELATIVE PERCENT: NORMAL
PLATELET # BLD: 270 K/ΜL
PMV BLD AUTO: NORMAL FL
POTASSIUM SERPL-SCNC: 4.7 MMOL/L
RBC # BLD: 4.33 10^6/ΜL
SODIUM BLD-SCNC: 138 MMOL/L
WBC # BLD: 5.54 10^3/ML

## 2022-01-14 ENCOUNTER — OFFICE VISIT (OUTPATIENT)
Dept: NEPHROLOGY | Age: 72
End: 2022-01-14
Payer: MEDICARE

## 2022-01-14 VITALS
BODY MASS INDEX: 38.73 KG/M2 | DIASTOLIC BLOOD PRESSURE: 65 MMHG | WEIGHT: 205 LBS | SYSTOLIC BLOOD PRESSURE: 125 MMHG | OXYGEN SATURATION: 97 % | HEART RATE: 91 BPM

## 2022-01-14 DIAGNOSIS — N18.4 CKD (CHRONIC KIDNEY DISEASE), STAGE IV (HCC): Primary | ICD-10-CM

## 2022-01-14 DIAGNOSIS — E11.21 DIABETIC NEPHROPATHY ASSOCIATED WITH TYPE 2 DIABETES MELLITUS (HCC): ICD-10-CM

## 2022-01-14 DIAGNOSIS — I10 PRIMARY HYPERTENSION: ICD-10-CM

## 2022-01-14 PROBLEM — R09.89 SINUS SYMPTOM: Status: RESOLVED | Noted: 2020-11-24 | Resolved: 2022-01-14

## 2022-01-14 PROBLEM — M79.675 CHRONIC PAIN OF TOES OF BOTH FEET: Status: RESOLVED | Noted: 2020-11-24 | Resolved: 2022-01-14

## 2022-01-14 PROBLEM — M76.891 TENDINITIS OF RIGHT KNEE: Status: RESOLVED | Noted: 2020-11-24 | Resolved: 2022-01-14

## 2022-01-14 PROBLEM — M25.561 RIGHT KNEE PAIN: Status: RESOLVED | Noted: 2020-11-24 | Resolved: 2022-01-14

## 2022-01-14 PROBLEM — Z91.89 AT RISK OF DISEASE: Status: RESOLVED | Noted: 2020-11-24 | Resolved: 2022-01-14

## 2022-01-14 PROBLEM — G89.29 CHRONIC PAIN OF TOES OF BOTH FEET: Status: RESOLVED | Noted: 2020-11-24 | Resolved: 2022-01-14

## 2022-01-14 PROBLEM — H10.9 CONJUNCTIVITIS: Status: RESOLVED | Noted: 2020-11-24 | Resolved: 2022-01-14

## 2022-01-14 PROBLEM — H61.23 BILATERAL IMPACTED CERUMEN: Status: RESOLVED | Noted: 2020-11-24 | Resolved: 2022-01-14

## 2022-01-14 PROBLEM — Z78.0 ASYMPTOMATIC AGE-RELATED POSTMENOPAUSAL STATE: Status: RESOLVED | Noted: 2020-11-24 | Resolved: 2022-01-14

## 2022-01-14 PROBLEM — N39.0 ACUTE URINARY TRACT INFECTION: Status: RESOLVED | Noted: 2020-11-24 | Resolved: 2022-01-14

## 2022-01-14 PROBLEM — M79.674 CHRONIC PAIN OF TOES OF BOTH FEET: Status: RESOLVED | Noted: 2020-11-24 | Resolved: 2022-01-14

## 2022-01-14 PROBLEM — Z78.9 NON-SMOKER: Status: RESOLVED | Noted: 2020-11-24 | Resolved: 2022-01-14

## 2022-01-14 PROCEDURE — 1123F ACP DISCUSS/DSCN MKR DOCD: CPT | Performed by: INTERNAL MEDICINE

## 2022-01-14 PROCEDURE — G8400 PT W/DXA NO RESULTS DOC: HCPCS | Performed by: INTERNAL MEDICINE

## 2022-01-14 PROCEDURE — 1036F TOBACCO NON-USER: CPT | Performed by: INTERNAL MEDICINE

## 2022-01-14 PROCEDURE — 4040F PNEUMOC VAC/ADMIN/RCVD: CPT | Performed by: INTERNAL MEDICINE

## 2022-01-14 PROCEDURE — 2022F DILAT RTA XM EVC RTNOPTHY: CPT | Performed by: INTERNAL MEDICINE

## 2022-01-14 PROCEDURE — 99213 OFFICE O/P EST LOW 20 MIN: CPT | Performed by: INTERNAL MEDICINE

## 2022-01-14 PROCEDURE — 3017F COLORECTAL CA SCREEN DOC REV: CPT | Performed by: INTERNAL MEDICINE

## 2022-01-14 PROCEDURE — G8484 FLU IMMUNIZE NO ADMIN: HCPCS | Performed by: INTERNAL MEDICINE

## 2022-01-14 PROCEDURE — G8417 CALC BMI ABV UP PARAM F/U: HCPCS | Performed by: INTERNAL MEDICINE

## 2022-01-14 PROCEDURE — G8427 DOCREV CUR MEDS BY ELIG CLIN: HCPCS | Performed by: INTERNAL MEDICINE

## 2022-01-14 PROCEDURE — 3046F HEMOGLOBIN A1C LEVEL >9.0%: CPT | Performed by: INTERNAL MEDICINE

## 2022-01-14 PROCEDURE — 1090F PRES/ABSN URINE INCON ASSESS: CPT | Performed by: INTERNAL MEDICINE

## 2022-01-14 NOTE — PROGRESS NOTES
Renal Progress Note    Assessment and Plan:      Diagnosis Orders   1. CKD (chronic kidney disease), stage IV ([address]     2. Diabetic nephropathy associated with type 2 diabetes mellitus ([address]     3. Primary hypertension             PLAN:  1. I discussed my thoughts with the patient at length. 2. She understood. 3. Labs reviewed together with the patient in epic  4. I addressed her questions. 5.  Serum creatinine is slowly but progressively worsening. 6. Serum creatinine is increased to 2.2 mg/dL from 1.8 mg/L in October 2021. 7. Patient is taking a combination of irbesartan and valsartan hydrochlorothiazide. 8. She is also on omeprazole. 9. Discontinue valsartan with chlorothiazide. 10. Continue irbesartan. 11. Discontinue omeprazole. 12. Rationale for this discussed with the patient. 13. Avoid nonsteroidal anti-inflammatory drugs. 14. List provided to the patient. 15. Return visit in 3 months with labs     Diagnosis Orders   1. CKD (chronic kidney disease), stage IV ([address]     2. Diabetic nephropathy associated with type 2 diabetes mellitus ([address]     3. Primary hypertension           Patient Active Problem List   Diagnosis    Chronic kidney disease    Controlled type 2 diabetes mellitus without complication ([address]    CAD (coronary artery disease)    Metabolic bone disease    Acquired hypothyroidism    Morbid obesity ([address]    Benign essential hypertension    Body mass index (BMI) of 37.0 to 37.9 in adult    Gastroesophageal reflux disease    Long term current use of aspirin    Long term current use of insulin (HCC)    Mixed anxiety and depressive disorder    Mixed hyperlipidemia    Persistent insomnia    Sleep-related breathing disorder    Umbilical hernia           Subjective:   Chief complaint:  Chief Complaint   Patient presents with    Chronic Kidney Disease     Stage IV      HPI:This is a follow up visit for Ms. Silvia Rosario who is here today for return appointment.   We see her for chronic kidney disease. Ragini Feng She was previously followed by my retired partner Dr. Riki Maher. She was last seen about 3 months ago. Doing well since then. No complaint. No chest pain or shortness of breath. No difficulties with urination. Appetite is good. No new medications. ROS:  Pertinent positives stated above in HPI. All other systems were reviewed and were negative. Medications:     Current Outpatient Medications   Medication Sig Dispense Refill    Dulaglutide (TRULICITY) 3 JN/8.4AT SOPN Inject 3 mg into the skin once a week 12 pen 3    aspirin 325 MG tablet 325 mg      Insulin NPH Human, Isophane, (NOVOLIN N SC) 25 Units      omeprazole 20 MG EC tablet Take 20 mg by mouth daily       PARoxetine (PAXIL) 20 MG tablet 20 mg daily      irbesartan (AVAPRO) 150 MG tablet 150 mg daily      atorvastatin (LIPITOR) 80 MG tablet 80 mg every evening      valsartan-hydrochlorothiazide (DIOVAN-HCT) 320-25 MG per tablet Take 1 tablet by mouth daily       atenolol (TENORMIN) 25 MG tablet 25 mg daily      amLODIPine (NORVASC) 10 MG tablet 10 mg daily      busPIRone (BUSPAR) 10 MG tablet 10 mg daily      fenofibrate 160 MG tablet 160 mg daily      gabapentin (NEURONTIN) 300 MG capsule 300 mg 3 times daily.  INSULIN REGULAR HUMAN IJ Inject 10 Units as directed Plus sliding scale      insulin NPH (HUMULIN N;NOVOLIN N) 100 UNIT/ML injection vial Inject 50 Units into the skin nightly      Multiple Vitamins-Minerals (THERAPEUTIC MULTIVITAMIN-MINERALS) tablet Take 1 tablet by mouth daily.  levothyroxine (SYNTHROID) 50 MCG tablet Take 50 mcg by mouth Daily. No current facility-administered medications for this visit.        Lab Results:    CBC:   Lab Results   Component Value Date    WBC 5.54 12/10/2021    HGB 12.9 12/10/2021    HCT 40.4 12/10/2021    MCV 88.4 01/21/2021     12/10/2021     BMP:    Lab Results   Component Value Date     12/10/2021     11/12/2021     10/08/2021    K 4.7 12/10/2021    K 4.1 11/12/2021    K 4.3 10/08/2021     12/10/2021     11/12/2021     10/08/2021    CO2 29 12/10/2021    CO2 31 11/12/2021    CO2 29 10/08/2021    BUN 49 12/10/2021    BUN 45 11/12/2021    BUN 31 10/08/2021    CREATININE 2.2 12/10/2021    CREATININE 2.1 11/12/2021    CREATININE 1.8 10/08/2021    GLUCOSE 327 12/10/2021    GLUCOSE 185 11/12/2021    GLUCOSE 163 10/08/2021      Hepatic:   Lab Results   Component Value Date    AST 24 11/12/2021    AST 26 11/19/2020    AST 27 01/08/2019    ALT 29 11/12/2021    ALT 42 11/19/2020    ALT 17 01/08/2019    BILITOT 0.2 11/12/2021    BILITOT 0.2 11/19/2020    BILITOT 0.3 01/08/2019    ALKPHOS 67 11/12/2021    ALKPHOS 90 11/19/2020    ALKPHOS 59 01/08/2019     BNP: No results found for: BNP  Lipids:   Lab Results   Component Value Date    CHOL 190 11/12/2021    HDL 19 (A) 11/12/2021     INR: No results found for: INR  URINE: No results found for: NAUR, PROTUR  No results found for: NITRU, COLORU, PHUR, LABCAST, WBCUA, RBCUA, MUCUS, TRICHOMONAS, YEAST, BACTERIA, CLARITYU, SPECGRAV, LEUKOCYTESUR, UROBILINOGEN, BILIRUBINUR, BLOODU, GLUCOSEU, KETUA, AMORPHOUS   Microalbumen/Creatinine ratio:  No components found for: RUCREAT    Objective:   Vitals: /65 (Site: Left Upper Arm, Position: Sitting, Cuff Size: Large Adult)   Pulse 91   Wt 205 lb (93 kg)   SpO2 97%   BMI 38.73 kg/m²      Constitutional:  Alert, awake, no apparent distress  Skin:normal with no rash or any lesions  HEENT:Pupils are reactive . Throat is clear. Oral mucosa is moist.  Neck:supple with no thyromegaly or bruit   Cardiovascular:  S1, S2 without murmur   Respiratory:  Clear to auscultation with no wheezes or rales  Abdomen: +bowel sound, soft, non tender and no bruit  Ext: No LE edema  Musculoskeletal:Intact  Neuro:Alert, awake and oriented with no obvious focal deficit.   Speech is normal.    Electronically signed by Vidal Brown MD on 1/14/2022 at 11:56 AM   **This report has been created using voice recognition software. It maycontain minor  errors which are inherent in voice recognition technology. **

## 2022-01-14 NOTE — PATIENT INSTRUCTIONS
Drugs to Avoid with Chronic Kidney Disease    Non-steroidal anti-inflammatory drugs (NSAIDS)    Potential complication and side effects of NSAIDS include:   Kidney injury and worsening kidney function    Risk of stomach ulcer and intestinal bleeding   Fluid retention and edema   Increased Blood Pressure    Non-Steroidal Anti-Inflammatory Drugs     Aspirin (Anacin, Ascriptin, zhou, Bufferin, Ecotrin, Excedrin)   Celecoxib (Celebrex)   Choline and magnesium salicylates (CMT, Trisosal, Trilisate)   Choline salicylate (Arthropan)   Diclofenac (Voltaren, Arthrotec, Cataflam, Cambia, Voltaren-XR, Zipsor)   Diflunisal (Dolobid)   Etodolac (Lodine XL, Lodine)   Famotidine + Ibuprofen (Duexis)   Fenoprofen (Nalfon, Nalfon 200)   Furbiprofen (Asaid)   Ibuprofen (Advil, Motrin, Midol, Nuprin, Genpril, Dolgesic,Profen,, Vicoprofen,Combunox, Actiprofen, Addaprin, Caldolor, Haltran, Q-Profen,Ibren, Menadol, Rufen,Saleto-200, [place],Ultraprin, Uni-Pro,Wal-Profen)   Indomethacin (Indocin, Indomethegan, Indo-Tim)    Ketoprofen (Orudis  KT, Oruvail, Actron)   Ketorolac (Toradol, Sprix)   Magnesium Sulfate (Arthritab, Zhou Select, Kalen's pills, Tonio, Mobidin, Mobogesic)   Meclofenamate Sodium (Ponstel)   Meloxicam (Mobic)   Naproxen (Aleve, Anaprox, Naprosyn, EC-Naprosyn, Naprelan, All Day Pain Relief, Aflaxen, Anaprox-DS, Midol Extended Relief, Naprelan,Prevacid NapraPac, Naprapac, Vimovo)   Nabumetone (Relafen)   Oxaprozin (Daypro)   Piroxicam (Feldene)   Rofecoxib (Vioxx)   Salsalate (Amigesic, Anaflex 750, Disalcid, Marthritic, Mono-gesic, Salflex, Salsitab)   Sodium salicylate (various generics)   Sulindac (Clinoril)   Tolmetin (Tolectin)   Valdecoxib (Bextra)   Mefenamic Acid (Ponstel)   Famotidine and Ibuprofen (Duexis)   Meclofenamate (Meclomen)    Antibiotics   Bactrim   Gentamycin   Tobramycin   Vancomycin   Tetracycline   Macrobid     Other                  IV contrast dye

## 2022-04-25 LAB
BUN BLDV-MCNC: 45 MG/DL
CALCIUM SERPL-MCNC: 10.7 MG/DL
CHLORIDE BLD-SCNC: 99 MMOL/L
CO2: 27 MMOL/L
CREAT SERPL-MCNC: 1.7 MG/DL
GFR CALCULATED: 32
GLUCOSE BLD-MCNC: 256 MG/DL
POTASSIUM SERPL-SCNC: 4.5 MMOL/L
PTH INTACT: 15.2
SODIUM BLD-SCNC: 136 MMOL/L
VITAMIN D 25-HYDROXY: 25.7
VITAMIN D2, 25 HYDROXY: NORMAL
VITAMIN D3,25 HYDROXY: NORMAL

## 2022-04-29 ENCOUNTER — OFFICE VISIT (OUTPATIENT)
Dept: NEPHROLOGY | Age: 72
End: 2022-04-29
Payer: MEDICARE

## 2022-04-29 VITALS
SYSTOLIC BLOOD PRESSURE: 117 MMHG | WEIGHT: 203.6 LBS | HEART RATE: 87 BPM | OXYGEN SATURATION: 95 % | BODY MASS INDEX: 38.47 KG/M2 | DIASTOLIC BLOOD PRESSURE: 64 MMHG

## 2022-04-29 DIAGNOSIS — N18.32 STAGE 3B CHRONIC KIDNEY DISEASE (HCC): Primary | ICD-10-CM

## 2022-04-29 DIAGNOSIS — I10 PRIMARY HYPERTENSION: ICD-10-CM

## 2022-04-29 DIAGNOSIS — E55.9 VITAMIN D DEFICIENCY: ICD-10-CM

## 2022-04-29 DIAGNOSIS — E11.21 DIABETIC NEPHROPATHY ASSOCIATED WITH TYPE 2 DIABETES MELLITUS (HCC): ICD-10-CM

## 2022-04-29 PROCEDURE — 1036F TOBACCO NON-USER: CPT | Performed by: INTERNAL MEDICINE

## 2022-04-29 PROCEDURE — 1123F ACP DISCUSS/DSCN MKR DOCD: CPT | Performed by: INTERNAL MEDICINE

## 2022-04-29 PROCEDURE — G8417 CALC BMI ABV UP PARAM F/U: HCPCS | Performed by: INTERNAL MEDICINE

## 2022-04-29 PROCEDURE — 4040F PNEUMOC VAC/ADMIN/RCVD: CPT | Performed by: INTERNAL MEDICINE

## 2022-04-29 PROCEDURE — 3046F HEMOGLOBIN A1C LEVEL >9.0%: CPT | Performed by: INTERNAL MEDICINE

## 2022-04-29 PROCEDURE — 1090F PRES/ABSN URINE INCON ASSESS: CPT | Performed by: INTERNAL MEDICINE

## 2022-04-29 PROCEDURE — G8400 PT W/DXA NO RESULTS DOC: HCPCS | Performed by: INTERNAL MEDICINE

## 2022-04-29 PROCEDURE — G8427 DOCREV CUR MEDS BY ELIG CLIN: HCPCS | Performed by: INTERNAL MEDICINE

## 2022-04-29 PROCEDURE — 99213 OFFICE O/P EST LOW 20 MIN: CPT | Performed by: INTERNAL MEDICINE

## 2022-04-29 PROCEDURE — 2022F DILAT RTA XM EVC RTNOPTHY: CPT | Performed by: INTERNAL MEDICINE

## 2022-04-29 PROCEDURE — 3017F COLORECTAL CA SCREEN DOC REV: CPT | Performed by: INTERNAL MEDICINE

## 2022-04-29 NOTE — PROGRESS NOTES
Renal Progress Note    Assessment and Plan:      Diagnosis Orders   1. Stage 3b chronic kidney disease ([facility] [address]     2. Diabetic nephropathy associated with type 2 diabetes mellitus ([address]     3. Primary hypertension     4. Vitamin D deficiency           PLAN:  1. Lab results are reviewed with the patient. 2. She understood. 3. Went to the lab together in epic. 4. Serum creatinine is improved to 1.7 mg/dL from 2.2 mg/dL. 5. Vitamin D level is slightly low at 25.7. 6. Serum calcium borderline high at 10.7 mg/dL. 7. Medications reviewed. 8. Vitamin D3 over-the-counter 2000 international unit 1 capsule a day for low vitamin D level. 9. Return visit in 6 months with labs          Patient Active Problem List   Diagnosis    Chronic kidney disease    Controlled type 2 diabetes mellitus without complication ([facility] [address]    CAD (coronary artery disease)    Metabolic bone disease    Acquired hypothyroidism    Morbid obesity ([facility] [address]    Benign essential hypertension    Body mass index (BMI) of 37.0 to 37.9 in adult    Gastroesophageal reflux disease    Long term current use of aspirin    Long term current use of insulin (HCC)    Mixed anxiety and depressive disorder    Mixed hyperlipidemia    Persistent insomnia    Sleep-related breathing disorder    Umbilical hernia           Subjective:   Chief complaint:  Chief Complaint   Patient presents with    Chronic Kidney Disease     Stage IIIb      HPI:This is a follow up visit for Ms Salcido Roro here today for return appointment. I see her for chronic kidney disease. She was last seen about 3 months ago. Doing well since then with no complaint. Serum creatinine had increased to 2.2 mg/dL from 1.8 mg/dL before  We discontinued losartan with hydrochlorothiazide. She was on also on irbesartan which was continued. Doing well with no complaint. No chest pain or shortness of breath. No nausea or vomiting. No fever chills. No headaches.   She urinates well.    ROS:  Pertinent positives stated above in HPI. All other systems were reviewed and were negative. Medications:     Current Outpatient Medications   Medication Sig Dispense Refill    Dulaglutide (TRULICITY) 3 JJ/6.6JJ SOPN Inject 3 mg into the skin once a week 12 pen 3    aspirin 325 MG tablet 325 mg      irbesartan (AVAPRO) 150 MG tablet 150 mg daily      atorvastatin (LIPITOR) 80 MG tablet 80 mg every evening      atenolol (TENORMIN) 25 MG tablet 25 mg daily      amLODIPine (NORVASC) 10 MG tablet 10 mg daily      fenofibrate 160 MG tablet 160 mg daily      gabapentin (NEURONTIN) 300 MG capsule 300 mg 3 times daily.  INSULIN REGULAR HUMAN IJ Inject 10 Units as directed Plus sliding scale      insulin NPH (HUMULIN N;NOVOLIN N) 100 UNIT/ML injection vial Inject into the skin nightly 25 units in am and 50 units in pm      Multiple Vitamins-Minerals (THERAPEUTIC MULTIVITAMIN-MINERALS) tablet Take 1 tablet by mouth daily.  levothyroxine (SYNTHROID) 50 MCG tablet Take 50 mcg by mouth Daily. (Patient not taking: Reported on 4/29/2022)       No current facility-administered medications for this visit.        Lab Results:    CBC:   Lab Results   Component Value Date    WBC 5.54 12/10/2021    HGB 12.9 12/10/2021    HCT 40.4 12/10/2021    MCV 88.4 01/21/2021     12/10/2021     BMP:    Lab Results   Component Value Date     04/25/2022     12/10/2021     11/12/2021    K 4.5 04/25/2022    K 4.7 12/10/2021    K 4.1 11/12/2021    CL 99 04/25/2022     12/10/2021     11/12/2021    CO2 27 04/25/2022    CO2 29 12/10/2021    CO2 31 11/12/2021    BUN 45 04/25/2022    BUN 49 12/10/2021    BUN 45 11/12/2021    CREATININE 1.7 04/25/2022    CREATININE 2.2 12/10/2021    CREATININE 2.1 11/12/2021    GLUCOSE 256 04/25/2022    GLUCOSE 327 12/10/2021    GLUCOSE 185 11/12/2021      Hepatic:   Lab Results   Component Value Date    AST 24 11/12/2021    AST 26 11/19/2020    AST 27 01/08/2019    ALT 29 11/12/2021    ALT 42 11/19/2020    ALT 17 01/08/2019    BILITOT 0.2 11/12/2021    BILITOT 0.2 11/19/2020    BILITOT 0.3 01/08/2019    ALKPHOS 67 11/12/2021    ALKPHOS 90 11/19/2020    ALKPHOS 59 01/08/2019     BNP: No results found for: BNP  Lipids:   Lab Results   Component Value Date    CHOL 190 11/12/2021    HDL 19 (A) 11/12/2021     INR: No results found for: INR  URINE: No results found for: NAUR, PROTUR  No results found for: NITRU, COLORU, PHUR, LABCAST, WBCUA, RBCUA, MUCUS, TRICHOMONAS, YEAST, BACTERIA, CLARITYU, SPECGRAV, LEUKOCYTESUR, UROBILINOGEN, BILIRUBINUR, BLOODU, GLUCOSEU, KETUA, AMORPHOUS   Microalbumen/Creatinine ratio:  No components found for: RUCREAT    Objective:   Vitals: /64 (Site: Right Upper Arm, Position: Sitting, Cuff Size: Large Adult)   Pulse 87   Wt 203 lb 9.6 oz (92.4 kg)   SpO2 95%   BMI 38.47 kg/m²      Constitutional:  Alert, awake, no apparent distress  Skin:normal with no rash or any significant lesions  HEENT:Pupils are reactive . Throat is clear. Oral mucosa is moist.  Neck:supple with no thyromegaly, JVD, lymphadenopathy or bruit   Cardiovascular: Regular sinus rhythm without murmur, rubs or gallops   Respiratory:  Clear to auscultation with no wheezes or rales  Abdomen: Good bowel sound, soft, non tender and no bruit  Ext: No LE edema  Musculoskeletal:Intact  Neuro:Alert, awake and oriented with no obvious focal deficit. Speech is normal.    Electronically signed by Angela Chavarria MD on 4/29/2022 at 12:51 PM   **This report has been created using voice recognition software. It maycontain minor  errors which are inherent in voice recognition technology. **

## 2022-05-24 NOTE — PROGRESS NOTES
[facility]      Justin Sánchez         [id number]  5/25/2022   Chief Complaint   Patient presents with    Follow-up     1 year MOISES with Khris Watts         Pt of Dr. Altaf Cope    PAP Download:   Benjamin Zuniga or initial AHI: 79.6     Date of initial study: 11/23/2020      Compliant  100%     Noncompliant 0 %     PAP Type VAUTO      Level  25/4   Avg Hrs/Day [id number]  AHI: 2.1   Recorded compliance dates : 4/24/22-5/23/22  Machine/Mfg:   [x] ResMed    [] Respironics/Dreamstation   Interface:   [] Nasal    [] Nasal pillows   [x] FFM       Provider:      [] [facility]     [][employer]     [] [employer]    [x] [address]    [] [place]               [] [facility]      [] [employer]    [] [address]      [] Other    Neck Size: 25  Mallampati 4  ESS:  1  SAQLI: 91    Here is a scan of the most recent download:              Presentation:   Shaina Platt presents for 1 year sleep medicine follow up for obstructive sleep apnea  Since the last visit, Shaina Platt is doing great on her PAP , has no complaints   Is not using humidification , prefers not to   ESS well controlled     Equipment issues: The pressure is  acceptable, the mask is acceptable     Sleep issues:  Do you feel better? No  More rested? Yes   Better concentration? yes    Progress History:   Since last visit any new medical issues? No  New ER or hospital visits? No  Any new or changes in medicines? Yes off omeprazole. GERD still well controlled   Any new sleep medicines? No    Review of Systems -   Review of Systems   Constitutional: Negative for activity change, appetite change, chills, fatigue, fever and unexpected weight change. HENT: Negative. Eyes: Negative. Respiratory: Negative for cough, shortness of breath and wheezing. Cardiovascular: Negative for chest pain, palpitations and leg swelling. Gastrointestinal: Negative for abdominal pain, diarrhea, nausea and vomiting. Genitourinary: Negative. Musculoskeletal: Negative. Skin: Negative. Neurological: Negative. Hematological: Negative. Psychiatric/Behavioral: Negative. Negative for sleep disturbance. Physical Exam:    BMI:  Body mass index is 39.11 kg/m². Wt Readings from Last 3 Encounters:   05/25/22 207 lb (93.9 kg)   04/29/22 203 lb 9.6 oz (92.4 kg)   01/14/22 205 lb (93 kg)     Weight stable / unchanged  Vitals: /64 (Site: Left Upper Arm, Position: Sitting, Cuff Size: Medium Adult)   Pulse 66   Temp 98 °F (36.7 °C) (Oral)   Ht 5' 1\" (1.549 m)   Wt 207 lb (93.9 kg)   SpO2 99%   BMI 39.11 kg/m²       Physical Exam  Vitals and nursing note reviewed. Constitutional:       Appearance: Normal appearance. She is overweight. HENT:      Head: Normocephalic and atraumatic. Mouth/Throat:      Pharynx: Oropharynx is clear. Eyes:      Conjunctiva/sclera: Conjunctivae normal.   Pulmonary:      Effort: Pulmonary effort is normal. No tachypnea, bradypnea or respiratory distress. Skin:     Findings: No erythema or rash. Neurological:      Mental Status: She is alert and oriented to person, place, and time. Psychiatric:         Attention and Perception: Attention normal.         Mood and Affect: Mood normal.         Speech: Speech normal.         Behavior: Behavior normal.         Thought Content: Thought content normal.         Cognition and Memory: Cognition normal.         Judgment: Judgment normal.           ASSESSMENT/DIAGNOSIS     Diagnosis Orders   1. MOISES on CPAP  DME Order for CPAP as OP   2. Essential hypertension     3. Morbid obesity ([facility] [address]     4. Chronic kidney disease, unspecified CKD stage     5. Gastroesophageal reflux disease without esophagitis              Plan   Do you need any equipment today? Yes - updated rx for supplies to DME     - Download reviewed and discussed with patient  - She  was advised to continue current positive airway pressure therapy with above described pressure.    - She  advised to keep good compliance with current recommended pressure to get optimal results and clinical improvement  - Recommend 7-9 hours of sleep with PAP  - She was advised to call [employer] company regarding supplies if needed.   -She call my office for earlier appointment if needed for worsening of sleep symptoms.   - She was instructed on weight loss  - Jeff Singh was educated about my impression and plan. Patient verbalizesunderstanding.   We will see Fabian Urbano back in: 1 year with download    Information added by my medical assistant/LPN was reviewed today  Electronically signed by SHARITA Ramirez CNP on 5/25/2022 at 1:14 PM

## 2022-05-25 ENCOUNTER — OFFICE VISIT (OUTPATIENT)
Dept: PULMONOLOGY | Age: 72
End: 2022-05-25
Payer: MEDICARE

## 2022-05-25 VITALS
TEMPERATURE: 98 F | WEIGHT: 207 LBS | SYSTOLIC BLOOD PRESSURE: 118 MMHG | HEART RATE: 66 BPM | OXYGEN SATURATION: 99 % | BODY MASS INDEX: 39.08 KG/M2 | DIASTOLIC BLOOD PRESSURE: 64 MMHG | HEIGHT: 61 IN

## 2022-05-25 DIAGNOSIS — Z99.89 OSA ON CPAP: Primary | ICD-10-CM

## 2022-05-25 DIAGNOSIS — I10 ESSENTIAL HYPERTENSION: ICD-10-CM

## 2022-05-25 DIAGNOSIS — N18.9 CHRONIC KIDNEY DISEASE, UNSPECIFIED CKD STAGE: ICD-10-CM

## 2022-05-25 DIAGNOSIS — K21.9 GASTROESOPHAGEAL REFLUX DISEASE WITHOUT ESOPHAGITIS: ICD-10-CM

## 2022-05-25 DIAGNOSIS — G47.33 OSA ON CPAP: Primary | ICD-10-CM

## 2022-05-25 DIAGNOSIS — E66.01 MORBID OBESITY (HCC): ICD-10-CM

## 2022-05-25 PROCEDURE — 3017F COLORECTAL CA SCREEN DOC REV: CPT | Performed by: NURSE PRACTITIONER

## 2022-05-25 PROCEDURE — 1123F ACP DISCUSS/DSCN MKR DOCD: CPT | Performed by: NURSE PRACTITIONER

## 2022-05-25 PROCEDURE — 1090F PRES/ABSN URINE INCON ASSESS: CPT | Performed by: NURSE PRACTITIONER

## 2022-05-25 PROCEDURE — 99213 OFFICE O/P EST LOW 20 MIN: CPT | Performed by: NURSE PRACTITIONER

## 2022-05-25 PROCEDURE — G8400 PT W/DXA NO RESULTS DOC: HCPCS | Performed by: NURSE PRACTITIONER

## 2022-05-25 PROCEDURE — G8417 CALC BMI ABV UP PARAM F/U: HCPCS | Performed by: NURSE PRACTITIONER

## 2022-05-25 PROCEDURE — 1036F TOBACCO NON-USER: CPT | Performed by: NURSE PRACTITIONER

## 2022-05-25 PROCEDURE — G8427 DOCREV CUR MEDS BY ELIG CLIN: HCPCS | Performed by: NURSE PRACTITIONER

## 2022-05-25 ASSESSMENT — ENCOUNTER SYMPTOMS
EYES NEGATIVE: 1
VOMITING: 0
DIARRHEA: 0
SHORTNESS OF BREATH: 0
COUGH: 0
WHEEZING: 0
NAUSEA: 0
ABDOMINAL PAIN: 0

## 2023-01-12 DIAGNOSIS — M89.8X9 METABOLIC BONE DISEASE: ICD-10-CM

## 2023-01-12 DIAGNOSIS — E78.2 MIXED HYPERLIPIDEMIA: ICD-10-CM

## 2023-01-12 DIAGNOSIS — I10 BENIGN ESSENTIAL HYPERTENSION: Primary | ICD-10-CM

## 2023-01-12 DIAGNOSIS — E55.9 VITAMIN D DEFICIENCY: ICD-10-CM

## 2023-01-12 DIAGNOSIS — E03.9 ACQUIRED HYPOTHYROIDISM: ICD-10-CM

## 2023-01-12 DIAGNOSIS — E11.9 CONTROLLED TYPE 2 DIABETES MELLITUS WITHOUT COMPLICATION, UNSPECIFIED WHETHER LONG TERM INSULIN USE (HCC): ICD-10-CM

## 2023-01-12 RX ORDER — PREDNISOLONE ACETATE 10 MG/ML
SUSPENSION/ DROPS OPHTHALMIC
COMMUNITY
Start: 2022-12-30

## 2023-01-16 LAB
BUN BLDV-MCNC: 51 MG/DL
CALCIUM SERPL-MCNC: 11.1 MG/DL
CHLORIDE BLD-SCNC: 100 MMOL/L
CO2: 30 MMOL/L
CREAT SERPL-MCNC: 2.4 MG/DL
GFR CALCULATED: 21
GLUCOSE BLD-MCNC: 149 MG/DL
POTASSIUM SERPL-SCNC: 4.2 MMOL/L
PTH INTACT: 16
SODIUM BLD-SCNC: 139 MMOL/L
VITAMIN D 25-HYDROXY: 50
VITAMIN D2, 25 HYDROXY: NORMAL
VITAMIN D3,25 HYDROXY: NORMAL

## 2023-01-20 ENCOUNTER — OFFICE VISIT (OUTPATIENT)
Dept: NEPHROLOGY | Age: 73
End: 2023-01-20

## 2023-01-20 VITALS
BODY MASS INDEX: 38.71 KG/M2 | HEIGHT: 61 IN | OXYGEN SATURATION: 96 % | DIASTOLIC BLOOD PRESSURE: 86 MMHG | SYSTOLIC BLOOD PRESSURE: 128 MMHG | WEIGHT: 205 LBS | HEART RATE: 92 BPM

## 2023-01-20 DIAGNOSIS — N18.4 CKD (CHRONIC KIDNEY DISEASE), STAGE IV (HCC): Primary | ICD-10-CM

## 2023-01-20 DIAGNOSIS — I10 PRIMARY HYPERTENSION: ICD-10-CM

## 2023-01-20 DIAGNOSIS — E11.21 DIABETIC NEPHROPATHY ASSOCIATED WITH TYPE 2 DIABETES MELLITUS (HCC): ICD-10-CM

## 2023-01-20 RX ORDER — ERGOCALCIFEROL 1.25 MG/1
50000 CAPSULE ORAL WEEKLY
COMMUNITY

## 2023-01-20 NOTE — PROGRESS NOTES
Renal Progress Note    Assessment and Plan:      Diagnosis Orders   1. CKD (chronic kidney disease), stage IV ([facility] [address]        2. Diabetic nephropathy associated with type 2 diabetes mellitus ([facility] [address]        3. Primary hypertension                  PLAN:  Lab results reviewed with the patient  She understood  I addressed her questions  Serum creatinine is increased to 2.4 mg/dL from 1.7 mg/dL. BUN is 51 mg/L making it mostly prerenal azotemia from volume contraction  I discussed that with the patient  Medications reviewed  No changes  Increase  fluid intake significantly  Return visit in 4 weeks with labs            Patient Active Problem List   Diagnosis    Chronic kidney disease    Controlled type 2 diabetes mellitus without complication (HCC)    CAD (coronary artery disease)    Metabolic bone disease    Acquired hypothyroidism    Morbid obesity ([facility] [address]    Benign essential hypertension    Body mass index (BMI) of 37.0 to 37.9 in adult    Gastroesophageal reflux disease    Long term current use of aspirin    Long term current use of insulin (HCC)    Mixed anxiety and depressive disorder    Mixed hyperlipidemia    Persistent insomnia    Sleep-related breathing disorder    Umbilical hernia    Vitamin D deficiency           Subjective:   Chief complaint:  Chief Complaint   Patient presents with    Chronic Kidney Disease     iiib      HPI:This is a follow up visit for MsSanford Dion Myers who is here today for return appointment. We see her for chronic kidney disease. She was last seen April 2022. Denies any complaint today. Has good appetite. No chest pain or shortness of breath. Urine output is good. Had eye surgery right side since last seen    ROS:  Pertinent positives stated above in HPI. All other systems were reviewed and were negative.   Medications:     Current Outpatient Medications   Medication Sig Dispense Refill    vitamin D (ERGOCALCIFEROL) 1.25 MG (91424 UT) CAPS capsule Take 50,000 Units by mouth once a week prednisoLONE acetate (PRED FORTE) 1 % ophthalmic suspension INSTILL 1 DROP INTO RIGHT EYE 4 TIMES DAILY AS DIRECTED      Dulaglutide (TRULICITY) 3 UU/9.5CN SOPN Inject 3 mg into the skin once a week 12 pen 3    aspirin 325 MG tablet 325 mg      irbesartan (AVAPRO) 150 MG tablet 150 mg daily      atorvastatin (LIPITOR) 80 MG tablet 80 mg every evening      atenolol (TENORMIN) 25 MG tablet 25 mg daily      amLODIPine (NORVASC) 10 MG tablet 10 mg daily      fenofibrate 160 MG tablet 160 mg daily      gabapentin (NEURONTIN) 300 MG capsule 300 mg 3 times daily. INSULIN REGULAR HUMAN IJ Inject 10 Units as directed Plus sliding scale      insulin NPH (HUMULIN N;NOVOLIN N) 100 UNIT/ML injection vial Inject into the skin nightly 25 units in am and 50 units in pm      Multiple Vitamins-Minerals (THERAPEUTIC MULTIVITAMIN-MINERALS) tablet Take 1 tablet by mouth daily. levothyroxine (SYNTHROID) 50 MCG tablet Take 50 mcg by mouth Daily. (Patient not taking: No sig reported)       No current facility-administered medications for this visit.        Lab Results:    CBC:   Lab Results   Component Value Date    WBC 5.54 12/10/2021    HGB 12.9 12/10/2021    HCT 40.4 12/10/2021    MCV 88.4 01/21/2021     12/10/2021     BMP:    Lab Results   Component Value Date     01/16/2023     04/25/2022     12/10/2021    K 4.2 01/16/2023    K 4.5 04/25/2022    K 4.7 12/10/2021     01/16/2023    CL 99 04/25/2022     12/10/2021    CO2 30 01/16/2023    CO2 27 04/25/2022    CO2 29 12/10/2021    BUN 51 01/16/2023    BUN 45 04/25/2022    BUN 49 12/10/2021    CREATININE 2.4 01/16/2023    CREATININE 1.7 04/25/2022    CREATININE 2.2 12/10/2021    GLUCOSE 149 01/16/2023    GLUCOSE 256 04/25/2022    GLUCOSE 327 12/10/2021      Hepatic:   Lab Results   Component Value Date    AST 24 11/12/2021    AST 26 11/19/2020    AST 27 01/08/2019    ALT 29 11/12/2021    ALT 42 11/19/2020    ALT 17 01/08/2019    BILITOT 0.2 11/12/2021    BILITOT 0.2 11/19/2020    BILITOT 0.3 01/08/2019    ALKPHOS 67 11/12/2021    ALKPHOS 90 11/19/2020    ALKPHOS 59 01/08/2019     BNP: No results found for: BNP  Lipids:   Lab Results   Component Value Date    CHOL 190 11/12/2021    HDL 19 (A) 11/12/2021     INR: No results found for: INR  URINE: No results found for: NAUR, PROTUR  No results found for: NITRU, COLORU, PHUR, LABCAST, WBCUA, RBCUA, MUCUS, TRICHOMONAS, YEAST, BACTERIA, CLARITYU, SPECGRAV, LEUKOCYTESUR, UROBILINOGEN, BILIRUBINUR, BLOODU, GLUCOSEU, KETUA, AMORPHOUS   Microalbumen/Creatinine ratio:  No components found for: RUCREAT    Objective:   Vitals: /86 (Site: Right Upper Arm, Position: Sitting, Cuff Size: Large Adult)   Pulse 92   Ht 5' 1\" (1.549 m)   Wt 205 lb (93 kg)   SpO2 96%   BMI 38.73 kg/m²      Constitutional:  Alert, awake, no apparent distress  Skin:normal with no rash or any significant lesions  HEENT:Pupils are reactive . Throat is clear. Oral mucosa is moist.  Neck:supple with no thyromegaly, JVD, lymphadenopathy or bruit   Cardiovascular: Regular sinus rhythm without murmur, rubs or gallops   Respiratory:  Clear to auscultation with no wheezes or rales  Abdomen: Good bowel sound, soft, non tender and no bruit  Ext: No LE edema  Musculoskeletal:Intact  Neuro:Alert, awake and oriented with no obvious focal deficit. Speech is normal.**    Electronically signed by Terell Oshea MD on 1/20/2023 at 1:55 PM   **This report has been created using voice recognition software. It maycontain minor  errors which are inherent in voice recognition technology. **

## 2023-02-15 LAB
BUN BLDV-MCNC: 54 MG/DL
CALCIUM SERPL-MCNC: 10.4 MG/DL
CHLORIDE BLD-SCNC: 101 MMOL/L
CO2: 29 MMOL/L
CREAT SERPL-MCNC: 2.2 MG/DL
EGFR: 23
GLUCOSE BLD-MCNC: 216 MG/DL
POTASSIUM SERPL-SCNC: 4.4 MMOL/L
SODIUM BLD-SCNC: 140 MMOL/L

## 2023-02-17 ENCOUNTER — OFFICE VISIT (OUTPATIENT)
Dept: NEPHROLOGY | Age: 73
End: 2023-02-17
Payer: MEDICARE

## 2023-02-17 VITALS
SYSTOLIC BLOOD PRESSURE: 134 MMHG | HEART RATE: 80 BPM | BODY MASS INDEX: 37.91 KG/M2 | HEIGHT: 62 IN | WEIGHT: 206 LBS | DIASTOLIC BLOOD PRESSURE: 76 MMHG | OXYGEN SATURATION: 94 %

## 2023-02-17 DIAGNOSIS — I10 PRIMARY HYPERTENSION: ICD-10-CM

## 2023-02-17 DIAGNOSIS — E11.21 DIABETIC NEPHROPATHY ASSOCIATED WITH TYPE 2 DIABETES MELLITUS (HCC): ICD-10-CM

## 2023-02-17 DIAGNOSIS — E83.52 HYPERCALCEMIA: ICD-10-CM

## 2023-02-17 DIAGNOSIS — N18.4 CKD (CHRONIC KIDNEY DISEASE), STAGE IV (HCC): Primary | ICD-10-CM

## 2023-02-17 PROCEDURE — 1036F TOBACCO NON-USER: CPT | Performed by: INTERNAL MEDICINE

## 2023-02-17 PROCEDURE — 2022F DILAT RTA XM EVC RTNOPTHY: CPT | Performed by: INTERNAL MEDICINE

## 2023-02-17 PROCEDURE — G8427 DOCREV CUR MEDS BY ELIG CLIN: HCPCS | Performed by: INTERNAL MEDICINE

## 2023-02-17 PROCEDURE — 3046F HEMOGLOBIN A1C LEVEL >9.0%: CPT | Performed by: INTERNAL MEDICINE

## 2023-02-17 PROCEDURE — G8484 FLU IMMUNIZE NO ADMIN: HCPCS | Performed by: INTERNAL MEDICINE

## 2023-02-17 PROCEDURE — 3078F DIAST BP <80 MM HG: CPT | Performed by: INTERNAL MEDICINE

## 2023-02-17 PROCEDURE — 3017F COLORECTAL CA SCREEN DOC REV: CPT | Performed by: INTERNAL MEDICINE

## 2023-02-17 PROCEDURE — 1123F ACP DISCUSS/DSCN MKR DOCD: CPT | Performed by: INTERNAL MEDICINE

## 2023-02-17 PROCEDURE — G8400 PT W/DXA NO RESULTS DOC: HCPCS | Performed by: INTERNAL MEDICINE

## 2023-02-17 PROCEDURE — 3075F SYST BP GE 130 - 139MM HG: CPT | Performed by: INTERNAL MEDICINE

## 2023-02-17 PROCEDURE — 99213 OFFICE O/P EST LOW 20 MIN: CPT | Performed by: INTERNAL MEDICINE

## 2023-02-17 PROCEDURE — 1090F PRES/ABSN URINE INCON ASSESS: CPT | Performed by: INTERNAL MEDICINE

## 2023-02-17 PROCEDURE — G8417 CALC BMI ABV UP PARAM F/U: HCPCS | Performed by: INTERNAL MEDICINE

## 2023-02-17 NOTE — PROGRESS NOTES
Renal Progress Note    Assessment and Plan:      Diagnosis Orders   1. CKD (chronic kidney disease), stage IV ([facility] [address]        2. Diabetic nephropathy associated with type 2 diabetes mellitus ([facility] [address]        3. Primary hypertension                  PLAN:  I discussed my thoughts with the patient at length  She understood  Addressed her questions  Serum creatinine slightly improved to 2.2 mg/dL from 2.4 mg/dL. Serum calcium is  improved to 10.4 mg/dL from 11.1 mg/dL. Medications reviewed  No changes  Increase of fluid intake  Return visit in 3 months with labs          Patient Active Problem List   Diagnosis    Chronic kidney disease    Controlled type 2 diabetes mellitus without complication (HCC)    CAD (coronary artery disease)    Metabolic bone disease    Acquired hypothyroidism    Morbid obesity ([facility] [address]    Benign essential hypertension    Body mass index (BMI) of 37.0 to 37.9 in adult    Gastroesophageal reflux disease    Long term current use of aspirin    Long term current use of insulin (HCC)    Mixed anxiety and depressive disorder    Mixed hyperlipidemia    Persistent insomnia    Sleep-related breathing disorder    Umbilical hernia    Vitamin D deficiency           Subjective:   Chief complaint:  Chief Complaint   Patient presents with    Chronic Kidney Disease     iv      HPI:This is a follow up visit for Ms. Anne Evans who is here today for return appointment. I see her for chronic kidney disease. She was last seen about 4 weeks ago. Kidney function was worsening as a result we asked her to come back in 4 weeks. Doing well with no complaint today. Appetite is good. No chest pain. No shortness of breath. She appears somewhat dry    ROS:  Pertinent positives stated above in HPI. All other systems were reviewed and were negative.   Medications:     Current Outpatient Medications   Medication Sig Dispense Refill    vitamin D (ERGOCALCIFEROL) 1.25 MG (89518 UT) CAPS capsule Take 50,000 Units by mouth once a week      prednisoLONE acetate (PRED FORTE) 1 % ophthalmic suspension INSTILL 1 DROP INTO RIGHT EYE 4 TIMES DAILY AS DIRECTED      aspirin 325 MG tablet 325 mg      irbesartan (AVAPRO) 150 MG tablet 150 mg daily      atorvastatin (LIPITOR) 80 MG tablet 80 mg every evening      atenolol (TENORMIN) 25 MG tablet 25 mg daily      amLODIPine (NORVASC) 10 MG tablet 10 mg daily      fenofibrate 160 MG tablet 160 mg daily      gabapentin (NEURONTIN) 300 MG capsule 300 mg 3 times daily. INSULIN REGULAR HUMAN IJ Inject 10 Units as directed Plus sliding scale      insulin NPH (HUMULIN N;NOVOLIN N) 100 UNIT/ML injection vial Inject into the skin nightly 25 units in am and 50 units in pm      Multiple Vitamins-Minerals (THERAPEUTIC MULTIVITAMIN-MINERALS) tablet Take 1 tablet by mouth daily. levothyroxine (SYNTHROID) 50 MCG tablet Take 50 mcg by mouth Daily      Dulaglutide (TRULICITY) 3 DL/8.9IM SOPN Inject 3 mg into the skin once a week (Patient not taking: Reported on 2/17/2023) 12 pen 3     No current facility-administered medications for this visit.        Lab Results:    CBC:   Lab Results   Component Value Date    WBC 5.54 12/10/2021    HGB 12.9 12/10/2021    HCT 40.4 12/10/2021    MCV 88.4 01/21/2021     12/10/2021     BMP:    Lab Results   Component Value Date     02/15/2023     01/16/2023     04/25/2022    K 4.4 02/15/2023    K 4.2 01/16/2023    K 4.5 04/25/2022     02/15/2023     01/16/2023    CL 99 04/25/2022    CO2 29 02/15/2023    CO2 30 01/16/2023    CO2 27 04/25/2022    BUN 54 02/15/2023    BUN 51 01/16/2023    BUN 45 04/25/2022    CREATININE 2.20 02/15/2023    CREATININE 2.4 01/16/2023    CREATININE 1.7 04/25/2022    GLUCOSE 216 02/15/2023    GLUCOSE 149 01/16/2023    GLUCOSE 256 04/25/2022      Hepatic:   Lab Results   Component Value Date    AST 24 11/12/2021    AST 26 11/19/2020    AST 27 01/08/2019    ALT 29 11/12/2021    ALT 42 11/19/2020    ALT 17 01/08/2019 BILITOT 0.2 11/12/2021    BILITOT 0.2 11/19/2020    BILITOT 0.3 01/08/2019    ALKPHOS 67 11/12/2021    ALKPHOS 90 11/19/2020    ALKPHOS 59 01/08/2019     BNP: No results found for: BNP  Lipids:   Lab Results   Component Value Date    CHOL 190 11/12/2021    HDL 19 (A) 11/12/2021     INR: No results found for: INR  URINE: No results found for: Carleen Bers  No results found for: Raynald School, PHUR, LABCAST, WBCUA, RBCUA, MUCUS, TRICHOMONAS, YEAST, BACTERIA, CLARITYU, SPECGRAV, LEUKOCYTESUR, UROBILINOGEN, BILIRUBINUR, BLOODU, GLUCOSEU, KETUA, AMORPHOUS   Microalbumen/Creatinine ratio:  No components found for: RUCREAT    Objective:   Vitals: /76 (Site: Right Upper Arm, Position: Sitting, Cuff Size: Small Adult)   Pulse 80   Ht 5' 2\" (1.575 m)   Wt 206 lb (93.4 kg)   SpO2 94%   BMI 37.68 kg/m²      Constitutional:  Alert, awake, no apparent distress  Skin:normal with no rash or any significant lesions  HEENT:Pupils are reactive . Throat is clear. Oral mucosa is moist.  Neck:supple with no thyromegaly, JVD, lymphadenopathy or bruit **  Cardiovascular: Regular sinus rhythm without murmur, rubs or gallops   Respiratory:  Clear to auscultation with no wheezes or rales  Abdomen: Good bowel sound, soft, non tender and no bruit  Ext: No LE edema  Musculoskeletal:Intact  Neuro:Alert, awake and oriented with no obvious focal deficit. Speech is normal.**    Electronically signed by Promise Pedersen MD on 2/17/2023 at 1:14 PM   **This report has been created using voice recognition software. It maycontain minor  errors which are inherent in voice recognition technology. **

## 2023-05-24 ENCOUNTER — OFFICE VISIT (OUTPATIENT)
Dept: PULMONOLOGY | Age: 73
End: 2023-05-24
Payer: MEDICARE

## 2023-05-24 VITALS
BODY MASS INDEX: 37.06 KG/M2 | DIASTOLIC BLOOD PRESSURE: 76 MMHG | WEIGHT: 201.4 LBS | SYSTOLIC BLOOD PRESSURE: 114 MMHG | TEMPERATURE: 97.8 F | OXYGEN SATURATION: 92 % | HEIGHT: 62 IN | HEART RATE: 83 BPM

## 2023-05-24 DIAGNOSIS — I10 ESSENTIAL HYPERTENSION: ICD-10-CM

## 2023-05-24 DIAGNOSIS — E66.01 MORBID OBESITY (HCC): ICD-10-CM

## 2023-05-24 DIAGNOSIS — G47.33 OSA ON CPAP: Primary | ICD-10-CM

## 2023-05-24 DIAGNOSIS — Z99.89 OSA ON CPAP: Primary | ICD-10-CM

## 2023-05-24 PROBLEM — R01.1 HEART MURMUR: Status: ACTIVE | Noted: 2023-03-14

## 2023-05-24 PROBLEM — R00.2 PALPITATIONS: Status: ACTIVE | Noted: 2023-03-06

## 2023-05-24 PROBLEM — R09.89 CAROTID BRUIT: Status: ACTIVE | Noted: 2023-03-14

## 2023-05-24 PROCEDURE — 1090F PRES/ABSN URINE INCON ASSESS: CPT | Performed by: NURSE PRACTITIONER

## 2023-05-24 PROCEDURE — G8417 CALC BMI ABV UP PARAM F/U: HCPCS | Performed by: NURSE PRACTITIONER

## 2023-05-24 PROCEDURE — 1036F TOBACCO NON-USER: CPT | Performed by: NURSE PRACTITIONER

## 2023-05-24 PROCEDURE — 3074F SYST BP LT 130 MM HG: CPT | Performed by: NURSE PRACTITIONER

## 2023-05-24 PROCEDURE — 1123F ACP DISCUSS/DSCN MKR DOCD: CPT | Performed by: NURSE PRACTITIONER

## 2023-05-24 PROCEDURE — 99214 OFFICE O/P EST MOD 30 MIN: CPT | Performed by: NURSE PRACTITIONER

## 2023-05-24 PROCEDURE — G8400 PT W/DXA NO RESULTS DOC: HCPCS | Performed by: NURSE PRACTITIONER

## 2023-05-24 PROCEDURE — G8427 DOCREV CUR MEDS BY ELIG CLIN: HCPCS | Performed by: NURSE PRACTITIONER

## 2023-05-24 PROCEDURE — 3078F DIAST BP <80 MM HG: CPT | Performed by: NURSE PRACTITIONER

## 2023-05-24 PROCEDURE — 3017F COLORECTAL CA SCREEN DOC REV: CPT | Performed by: NURSE PRACTITIONER

## 2023-05-24 RX ORDER — ROSUVASTATIN CALCIUM 20 MG/1
TABLET, COATED ORAL
COMMUNITY
Start: 2023-05-22

## 2023-05-24 NOTE — PROGRESS NOTES
oz (91.4 kg)   SpO2 92% Comment: r/a  BMI 36.84 kg/m²       Physical Exam  Vitals and nursing note reviewed. Constitutional:       Appearance: Normal appearance. She is obese. HENT:      Head: Normocephalic and atraumatic. Mouth/Throat:      Pharynx: Oropharynx is clear. Eyes:      Conjunctiva/sclera: Conjunctivae normal.   Pulmonary:      Effort: Pulmonary effort is normal. No tachypnea, bradypnea or respiratory distress. Skin:     Findings: No erythema or rash. Neurological:      Mental Status: She is alert and oriented to person, place, and time. Psychiatric:         Attention and Perception: Attention normal.         Mood and Affect: Mood normal.         Speech: Speech normal.         Behavior: Behavior normal.         Thought Content: Thought content normal.         Cognition and Memory: Cognition normal.         Judgment: Judgment normal.         ASSESSMENT/DIAGNOSIS     Diagnosis Orders   1. MOISES on CPAP  DME Order for CPAP as OP      2. Morbid obesity ([address]        3. Essential hypertension                 Plan   Do you need any equipment today? Yes -printed rx for supplies, faxed to patient's DME , was provided with sample medium air fit F30 resmed hybrid mask to keep mask away from eye due to recent surgery . Zoe Armando was seen today for follow-up. Diagnoses and all orders for this visit:    MOISES on CPAP- controlled   - Download reviewed and discussed with patient  - She  was advised to continue current positive airway pressure therapy with above described pressure. - She  advised to keep good compliance with current recommended pressure to get optimal results and clinical improvement  - Recommend 7-9 hours of sleep with PAP  - She was advised to call DME company regarding supplies if needed.   -She call my office for earlier appointment if needed for worsening of sleep symptoms.        Information added by my medical assistant/LPN was reviewed today  -     DME Order for CPAP as

## 2023-08-04 LAB
BUN BLDV-MCNC: 45 MG/DL
CALCIUM SERPL-MCNC: 10.7 MG/DL
CHLORIDE BLD-SCNC: 98 MMOL/L
CO2: 23 MMOL/L
CREAT SERPL-MCNC: 1.9 MG/DL
EGFR: 28
GLUCOSE BLD-MCNC: 154 MG/DL
POTASSIUM SERPL-SCNC: 5 MMOL/L
PTH INTACT: 18.6
SODIUM BLD-SCNC: 137 MMOL/L
VITAMIN D 25-HYDROXY: 37.6
VITAMIN D2, 25 HYDROXY: NORMAL
VITAMIN D3,25 HYDROXY: NORMAL

## 2023-08-18 ENCOUNTER — OFFICE VISIT (OUTPATIENT)
Dept: NEPHROLOGY | Age: 73
End: 2023-08-18
Payer: MEDICARE

## 2023-08-18 VITALS
BODY MASS INDEX: 37.57 KG/M2 | OXYGEN SATURATION: 97 % | HEART RATE: 89 BPM | SYSTOLIC BLOOD PRESSURE: 118 MMHG | DIASTOLIC BLOOD PRESSURE: 68 MMHG | WEIGHT: 199 LBS | HEIGHT: 61 IN

## 2023-08-18 DIAGNOSIS — N25.81 HYPERPARATHYROIDISM, SECONDARY RENAL (HCC): ICD-10-CM

## 2023-08-18 DIAGNOSIS — E83.52 HYPERCALCEMIA: ICD-10-CM

## 2023-08-18 DIAGNOSIS — I10 PRIMARY HYPERTENSION: ICD-10-CM

## 2023-08-18 DIAGNOSIS — E11.21 DIABETIC NEPHROPATHY ASSOCIATED WITH TYPE 2 DIABETES MELLITUS (HCC): ICD-10-CM

## 2023-08-18 DIAGNOSIS — N18.4 CKD (CHRONIC KIDNEY DISEASE), STAGE IV (HCC): Primary | ICD-10-CM

## 2023-08-18 PROCEDURE — 99214 OFFICE O/P EST MOD 30 MIN: CPT | Performed by: INTERNAL MEDICINE

## 2023-08-18 PROCEDURE — G8427 DOCREV CUR MEDS BY ELIG CLIN: HCPCS | Performed by: INTERNAL MEDICINE

## 2023-08-18 PROCEDURE — 3078F DIAST BP <80 MM HG: CPT | Performed by: INTERNAL MEDICINE

## 2023-08-18 PROCEDURE — 1036F TOBACCO NON-USER: CPT | Performed by: INTERNAL MEDICINE

## 2023-08-18 PROCEDURE — 1090F PRES/ABSN URINE INCON ASSESS: CPT | Performed by: INTERNAL MEDICINE

## 2023-08-18 PROCEDURE — 2022F DILAT RTA XM EVC RTNOPTHY: CPT | Performed by: INTERNAL MEDICINE

## 2023-08-18 PROCEDURE — G8400 PT W/DXA NO RESULTS DOC: HCPCS | Performed by: INTERNAL MEDICINE

## 2023-08-18 PROCEDURE — G8417 CALC BMI ABV UP PARAM F/U: HCPCS | Performed by: INTERNAL MEDICINE

## 2023-08-18 PROCEDURE — 3017F COLORECTAL CA SCREEN DOC REV: CPT | Performed by: INTERNAL MEDICINE

## 2023-08-18 PROCEDURE — 3046F HEMOGLOBIN A1C LEVEL >9.0%: CPT | Performed by: INTERNAL MEDICINE

## 2023-08-18 PROCEDURE — 3074F SYST BP LT 130 MM HG: CPT | Performed by: INTERNAL MEDICINE

## 2023-08-18 PROCEDURE — 1123F ACP DISCUSS/DSCN MKR DOCD: CPT | Performed by: INTERNAL MEDICINE

## 2023-08-18 RX ORDER — PITAVASTATIN MAGNESIUM 2 MG/1
TABLET, FILM COATED ORAL DAILY
COMMUNITY

## 2023-08-18 NOTE — PROGRESS NOTES
90% according to her. She has appointment to see a vascular surgeon. ROS:  Pertinent positives stated above in HPI. All other systems were reviewed and were negative. Medications:     Current Outpatient Medications   Medication Sig Dispense Refill    Pitavastatin Magnesium (ZYPITAMAG) 2 MG TABS Take by mouth daily      rosuvastatin (CRESTOR) 20 MG tablet       vitamin D (ERGOCALCIFEROL) 1.25 MG (87701 UT) CAPS capsule Take 1 capsule by mouth once a week      prednisoLONE acetate (PRED FORTE) 1 % ophthalmic suspension INSTILL 1 DROP INTO RIGHT EYE 4 TIMES DAILY AS DIRECTED      Dulaglutide (TRULICITY) 3 UE/2.7BF SOPN Inject 3 mg into the skin once a week 12 pen 3    aspirin 325 MG tablet 1 tablet      irbesartan (AVAPRO) 150 MG tablet 1 tablet daily      atenolol (TENORMIN) 25 MG tablet 1 tablet daily      amLODIPine (NORVASC) 10 MG tablet 1 tablet daily      gabapentin (NEURONTIN) 300 MG capsule 1 capsule 3 times daily. INSULIN REGULAR HUMAN IJ Inject 10 Units as directed Plus sliding scale      insulin NPH (HUMULIN N;NOVOLIN N) 100 UNIT/ML injection vial Inject into the skin nightly 25 units in am and 50 units in pm      Multiple Vitamins-Minerals (THERAPEUTIC MULTIVITAMIN-MINERALS) tablet Take 1 tablet by mouth daily      levothyroxine (SYNTHROID) 50 MCG tablet Take 1 tablet by mouth Daily       No current facility-administered medications for this visit.        Lab Results:    CBC:   Lab Results   Component Value Date    WBC 5.54 12/10/2021    HGB 12.9 12/10/2021    HCT 40.4 12/10/2021    MCV 88.4 01/21/2021     12/10/2021     BMP:    Lab Results   Component Value Date     08/04/2023     02/15/2023     01/16/2023    K 5.0 08/04/2023    K 4.4 02/15/2023    K 4.2 01/16/2023    CL 98 08/04/2023     02/15/2023     01/16/2023    CO2 23 08/04/2023    CO2 29 02/15/2023    CO2 30 01/16/2023    BUN 45 08/04/2023    BUN 54 02/15/2023    BUN 51 01/16/2023    CREATININE 1.9

## 2024-01-17 DIAGNOSIS — E55.9 VITAMIN D DEFICIENCY: ICD-10-CM

## 2024-01-17 DIAGNOSIS — N18.4 CKD (CHRONIC KIDNEY DISEASE), STAGE IV (HCC): Primary | ICD-10-CM

## 2024-01-17 DIAGNOSIS — N25.81 HYPERPARATHYROIDISM, SECONDARY RENAL (HCC): ICD-10-CM

## 2024-01-17 DIAGNOSIS — I10 PRIMARY HYPERTENSION: ICD-10-CM

## 2024-01-17 DIAGNOSIS — E11.21 DIABETIC NEPHROPATHY ASSOCIATED WITH TYPE 2 DIABETES MELLITUS (HCC): ICD-10-CM

## 2024-04-16 LAB
BUN BLDV-MCNC: 48 MG/DL
CALCIUM SERPL-MCNC: 9.7 MG/DL
CHLORIDE BLD-SCNC: 106 MMOL/L
CO2: 25 MMOL/L
CREAT SERPL-MCNC: 1.5 MG/DL
EGFR: 36
GLUCOSE BLD-MCNC: 69 MG/DL
POTASSIUM SERPL-SCNC: 4.1 MMOL/L
PTH INTACT: 7.4
SODIUM BLD-SCNC: 141 MMOL/L
VITAMIN D 25-HYDROXY: 37
VITAMIN D2, 25 HYDROXY: NORMAL
VITAMIN D3,25 HYDROXY: NORMAL

## 2024-04-19 ENCOUNTER — OFFICE VISIT (OUTPATIENT)
Dept: NEPHROLOGY | Age: 74
End: 2024-04-19
Payer: MEDICARE

## 2024-04-19 VITALS
SYSTOLIC BLOOD PRESSURE: 120 MMHG | DIASTOLIC BLOOD PRESSURE: 75 MMHG | HEIGHT: 61 IN | OXYGEN SATURATION: 95 % | HEART RATE: 89 BPM | WEIGHT: 201 LBS | BODY MASS INDEX: 37.95 KG/M2

## 2024-04-19 DIAGNOSIS — E11.21 DIABETIC NEPHROPATHY ASSOCIATED WITH TYPE 2 DIABETES MELLITUS (HCC): ICD-10-CM

## 2024-04-19 DIAGNOSIS — N18.32 STAGE 3B CHRONIC KIDNEY DISEASE (HCC): Primary | ICD-10-CM

## 2024-04-19 DIAGNOSIS — I10 ESSENTIAL HYPERTENSION: ICD-10-CM

## 2024-04-19 DIAGNOSIS — E78.5 DYSLIPIDEMIA: ICD-10-CM

## 2024-04-19 PROCEDURE — 3074F SYST BP LT 130 MM HG: CPT | Performed by: INTERNAL MEDICINE

## 2024-04-19 PROCEDURE — 3046F HEMOGLOBIN A1C LEVEL >9.0%: CPT | Performed by: INTERNAL MEDICINE

## 2024-04-19 PROCEDURE — 3017F COLORECTAL CA SCREEN DOC REV: CPT | Performed by: INTERNAL MEDICINE

## 2024-04-19 PROCEDURE — 3078F DIAST BP <80 MM HG: CPT | Performed by: INTERNAL MEDICINE

## 2024-04-19 PROCEDURE — G8417 CALC BMI ABV UP PARAM F/U: HCPCS | Performed by: INTERNAL MEDICINE

## 2024-04-19 PROCEDURE — 1123F ACP DISCUSS/DSCN MKR DOCD: CPT | Performed by: INTERNAL MEDICINE

## 2024-04-19 PROCEDURE — 2022F DILAT RTA XM EVC RTNOPTHY: CPT | Performed by: INTERNAL MEDICINE

## 2024-04-19 PROCEDURE — 99213 OFFICE O/P EST LOW 20 MIN: CPT | Performed by: INTERNAL MEDICINE

## 2024-04-19 PROCEDURE — 1090F PRES/ABSN URINE INCON ASSESS: CPT | Performed by: INTERNAL MEDICINE

## 2024-04-19 PROCEDURE — G8400 PT W/DXA NO RESULTS DOC: HCPCS | Performed by: INTERNAL MEDICINE

## 2024-04-19 PROCEDURE — 1036F TOBACCO NON-USER: CPT | Performed by: INTERNAL MEDICINE

## 2024-04-19 PROCEDURE — G8427 DOCREV CUR MEDS BY ELIG CLIN: HCPCS | Performed by: INTERNAL MEDICINE

## 2024-04-19 NOTE — PROGRESS NOTES
October 2023 Right carotid stent placed  Not currently taking the rosuvastatin because she feels like her heart is pounding. She is requesting with her PCP to try it every other day.

## 2024-04-19 NOTE — PROGRESS NOTES
Renal Progress Note    Assessment and Plan:      Diagnosis Orders   1. Stage 3b chronic kidney disease (HCC)        2. Diabetic nephropathy associated with type 2 diabetes mellitus (HCC)        3. Essential hypertension        4. Dyslipidemia                  PLAN:  Reviewed labs with the patient.  She understood  I addressed her questions  Serum creatinine is improved to 1.5 mg/dL up from 1.9 mg/dL  PTH is good at 7.4  From the level is good at 37.0  Medications reviewed  No changes  Return visit in 6 months and 12 months thereafter if serum creatinine remains in December          Patient Active Problem List   Diagnosis    Chronic kidney disease    Controlled type 2 diabetes mellitus without complication (HCC)    CAD (coronary artery disease)    Metabolic bone disease    Acquired hypothyroidism    Morbid obesity (HCC)    Essential hypertension    Body mass index (BMI) of 37.0 to 37.9 in adult    Gastroesophageal reflux disease    Long term current use of aspirin    Long term current use of insulin (HCC)    Mixed anxiety and depressive disorder    Mixed hyperlipidemia    Persistent insomnia    Sleep-related breathing disorder    Umbilical hernia    Vitamin D deficiency    Carotid bruit    Heart murmur    Palpitations    MOISES on CPAP           Subjective:   Chief complaint:  Chief Complaint   Patient presents with    Follow-up     8 month FU CKD IV      HPI:This is a follow up visit for Ms Doreen Mosley here today for return appointment.  I see her for chronic kidneys among other things.  She was last seen August 2023.  Doing well since then with no new issues.  She urinates well.  She has good appetite.  No recent chest pain or shortness of breath.Had carotid stenting since last seen at [facility].      ROS:  Pertinent positives stated above in HPI. All other systems were reviewed and were negative.  Medications:     Current Outpatient Medications   Medication Sig Dispense Refill    rosuvastatin (CRESTOR) 20 MG

## 2024-05-22 ENCOUNTER — OFFICE VISIT (OUTPATIENT)
Dept: PULMONOLOGY | Age: 74
End: 2024-05-22
Payer: MEDICARE

## 2024-05-22 VITALS
BODY MASS INDEX: 38.02 KG/M2 | HEART RATE: 86 BPM | TEMPERATURE: 98 F | OXYGEN SATURATION: 98 % | WEIGHT: 201.4 LBS | SYSTOLIC BLOOD PRESSURE: 136 MMHG | HEIGHT: 61 IN | DIASTOLIC BLOOD PRESSURE: 76 MMHG

## 2024-05-22 DIAGNOSIS — I25.10 CORONARY ARTERY DISEASE INVOLVING NATIVE HEART WITHOUT ANGINA PECTORIS, UNSPECIFIED VESSEL OR LESION TYPE: ICD-10-CM

## 2024-05-22 DIAGNOSIS — I10 ESSENTIAL HYPERTENSION: ICD-10-CM

## 2024-05-22 DIAGNOSIS — E66.01 MORBID OBESITY (HCC): ICD-10-CM

## 2024-05-22 DIAGNOSIS — G47.33 OSA TREATED WITH BIPAP: Primary | ICD-10-CM

## 2024-05-22 PROCEDURE — 1090F PRES/ABSN URINE INCON ASSESS: CPT | Performed by: NURSE PRACTITIONER

## 2024-05-22 PROCEDURE — 3017F COLORECTAL CA SCREEN DOC REV: CPT | Performed by: NURSE PRACTITIONER

## 2024-05-22 PROCEDURE — 3078F DIAST BP <80 MM HG: CPT | Performed by: NURSE PRACTITIONER

## 2024-05-22 PROCEDURE — 1036F TOBACCO NON-USER: CPT | Performed by: NURSE PRACTITIONER

## 2024-05-22 PROCEDURE — G8417 CALC BMI ABV UP PARAM F/U: HCPCS | Performed by: NURSE PRACTITIONER

## 2024-05-22 PROCEDURE — G8427 DOCREV CUR MEDS BY ELIG CLIN: HCPCS | Performed by: NURSE PRACTITIONER

## 2024-05-22 PROCEDURE — G8400 PT W/DXA NO RESULTS DOC: HCPCS | Performed by: NURSE PRACTITIONER

## 2024-05-22 PROCEDURE — 3075F SYST BP GE 130 - 139MM HG: CPT | Performed by: NURSE PRACTITIONER

## 2024-05-22 PROCEDURE — 99214 OFFICE O/P EST MOD 30 MIN: CPT | Performed by: NURSE PRACTITIONER

## 2024-05-22 PROCEDURE — 1123F ACP DISCUSS/DSCN MKR DOCD: CPT | Performed by: NURSE PRACTITIONER

## 2024-05-22 NOTE — PROGRESS NOTES
was advised to continue current positive airway pressure therapy with above described pressure.   - She  advised to keep good compliance with current recommended pressure to get optimal results and clinical improvement  - Recommend 7-9 hours of sleep with PAP  - She was advised to call DME company regarding supplies if needed.   -She call my office for earlier appointment if needed for worsening of sleep symptoms.     -     DME Order for CPAP as OP for supplies   Sent home with sample resmed airifit P30 I fit pack fitted with size medium nasal pillows in office.     - Discussed Inspire with patient  - Discussed qualifications and contraindications  - Discussed risk and benefit of Inspire vs PAP  - Discussed the whole process of Inspire   She is not interested based on this being a surgery and how long the process can take. Willing to try new mask.        Morbid obesity (HCC)- stable   Pt counseled on obesity, health risks associated with obesity and counseled on need for weight reduction.       Essential hypertension- controlled  Continue current medication, managed by cardiology     Coronary artery disease involving native heart without angina pectoris, unspecified vessel or lesion type-stable   Management per cardiology         Patient verbalizes understanding.  We will see Doreen Mosley back in: 3 months with download    Information added by my medical assistant/LPN was reviewed today    billing based on medical decision making     SHARITA Harley-CNP   5/22/2024

## 2024-10-14 LAB
BUN BLDV-MCNC: 42 MG/DL
CALCIUM SERPL-MCNC: 10.2 MG/DL
CHLORIDE BLD-SCNC: 103 MMOL/L
CO2: 26 MMOL/L
CREAT SERPL-MCNC: 1.7 MG/DL
EGFR: 31
GLUCOSE BLD-MCNC: 81 MG/DL
POTASSIUM SERPL-SCNC: 4.7 MMOL/L
PTH INTACT: 5.4
SODIUM BLD-SCNC: 142 MMOL/L
VITAMIN D 25-HYDROXY: 49.6
VITAMIN D2, 25 HYDROXY: NORMAL
VITAMIN D3,25 HYDROXY: NORMAL

## 2024-10-18 ENCOUNTER — OFFICE VISIT (OUTPATIENT)
Dept: NEPHROLOGY | Age: 74
End: 2024-10-18
Payer: MEDICARE

## 2024-10-18 VITALS
OXYGEN SATURATION: 100 % | WEIGHT: 195.7 LBS | HEIGHT: 62 IN | HEART RATE: 103 BPM | SYSTOLIC BLOOD PRESSURE: 130 MMHG | DIASTOLIC BLOOD PRESSURE: 75 MMHG | BODY MASS INDEX: 36.01 KG/M2

## 2024-10-18 DIAGNOSIS — E11.21 DIABETIC NEPHROPATHY ASSOCIATED WITH TYPE 2 DIABETES MELLITUS (HCC): ICD-10-CM

## 2024-10-18 DIAGNOSIS — I10 PRIMARY HYPERTENSION: ICD-10-CM

## 2024-10-18 DIAGNOSIS — N18.32 STAGE 3B CHRONIC KIDNEY DISEASE (HCC): Primary | ICD-10-CM

## 2024-10-18 DIAGNOSIS — E55.9 VITAMIN D DEFICIENCY: ICD-10-CM

## 2024-10-18 PROCEDURE — 2022F DILAT RTA XM EVC RTNOPTHY: CPT | Performed by: INTERNAL MEDICINE

## 2024-10-18 PROCEDURE — 3078F DIAST BP <80 MM HG: CPT | Performed by: INTERNAL MEDICINE

## 2024-10-18 PROCEDURE — G8417 CALC BMI ABV UP PARAM F/U: HCPCS | Performed by: INTERNAL MEDICINE

## 2024-10-18 PROCEDURE — 99213 OFFICE O/P EST LOW 20 MIN: CPT | Performed by: INTERNAL MEDICINE

## 2024-10-18 PROCEDURE — 3017F COLORECTAL CA SCREEN DOC REV: CPT | Performed by: INTERNAL MEDICINE

## 2024-10-18 PROCEDURE — 3046F HEMOGLOBIN A1C LEVEL >9.0%: CPT | Performed by: INTERNAL MEDICINE

## 2024-10-18 PROCEDURE — 3075F SYST BP GE 130 - 139MM HG: CPT | Performed by: INTERNAL MEDICINE

## 2024-10-18 PROCEDURE — G8482 FLU IMMUNIZE ORDER/ADMIN: HCPCS | Performed by: INTERNAL MEDICINE

## 2024-10-18 PROCEDURE — 1090F PRES/ABSN URINE INCON ASSESS: CPT | Performed by: INTERNAL MEDICINE

## 2024-10-18 PROCEDURE — 1036F TOBACCO NON-USER: CPT | Performed by: INTERNAL MEDICINE

## 2024-10-18 PROCEDURE — 1123F ACP DISCUSS/DSCN MKR DOCD: CPT | Performed by: INTERNAL MEDICINE

## 2024-10-18 PROCEDURE — G8427 DOCREV CUR MEDS BY ELIG CLIN: HCPCS | Performed by: INTERNAL MEDICINE

## 2024-10-18 PROCEDURE — G8400 PT W/DXA NO RESULTS DOC: HCPCS | Performed by: INTERNAL MEDICINE

## 2024-10-18 RX ORDER — ICOSAPENT ETHYL 1000 MG/1
2 CAPSULE ORAL 2 TIMES DAILY
COMMUNITY
Start: 2024-10-03

## 2024-10-18 RX ORDER — CLOPIDOGREL BISULFATE 75 MG/1
75 TABLET ORAL DAILY
COMMUNITY
Start: 2024-08-07

## 2024-10-18 NOTE — PROGRESS NOTES
04/16/2024    GLUCOSE 154 08/04/2023      Hepatic:   Lab Results   Component Value Date    AST 24 11/12/2021    AST 26 11/19/2020    AST 27 01/08/2019    ALT 29 11/12/2021    ALT 42 11/19/2020    ALT 17 01/08/2019    BILITOT 0.2 11/12/2021    BILITOT 0.2 11/19/2020    BILITOT 0.3 01/08/2019    ALKPHOS 67 11/12/2021    ALKPHOS 90 11/19/2020    ALKPHOS 59 01/08/2019     BNP: No results found for: \"BNP\"  Lipids:   Lab Results   Component Value Date    CHOL 190 11/12/2021    HDL 19 (A) 11/12/2021     INR: No results found for: \"INR\"  URINE: No results found for: \"NAUR\", \"PROTUR\"  No results found for: \"NITRU\", \"COLORU\", \"PHUR\", \"LABCAST\", \"WBCUA\", \"RBCUA\", \"MUCUS\", \"TRICHOMONAS\", \"YEAST\", \"BACTERIA\", \"CLARITYU\", \"SPECGRAV\", \"LEUKOCYTESUR\", \"UROBILINOGEN\", \"BILIRUBINUR\", \"BLOODU\", \"GLUCOSEU\", \"KETUA\", \"AMORPHOUS\"   Microalbumen/Creatinine ratio:  No components found for: \"RUCREAT\"    Objective:   Vitals: /75 (Site: Left Upper Arm, Position: Sitting, Cuff Size: Large Adult)   Pulse (!) 103   Ht 1.575 m (5' 2\")   Wt 88.8 kg (195 lb 11.2 oz)   SpO2 100%   BMI 35.79 kg/m²      Constitutional:  Alert, awake, no apparent distress  Skin:normal with no rash or any significant lesions  HEENT:Pupils are reactive .Throat is clear.  Oral mucosa is moist.  Neck:supple with no thyromegaly, JVD, lymphadenopathy or bruit **  Cardiovascular: Regular sinus rhythm without murmur, rubs or gallops   Respiratory:  Clear to auscultation with no wheezes or rales  Abdomen: Good bowel sound, soft, non tender and no bruit  Ext: No LE edema  Musculoskeletal:Intact  Neuro:Alert, awake and oriented with no obvious focal deficit.  Speech is normal.**    Electronically signed by Israel Drew MD on 10/18/2024 at 1:07 PM   **This report has been created using voice recognition software. It maycontain minor  errors which are inherent in voice recognition technology.**

## 2025-02-11 NOTE — TELEPHONE ENCOUNTER
Discharge Planning Assessment  [facility]     Patient Name: North Carcamo  MRN: [id number]  Today's Date: 2/11/2025    Admit Date: 2/8/2025    Plan: Home   Discharge Needs Assessment       Row Name 02/11/25 0901       Living Environment    People in Home spouse    Current Living Arrangements home    Potentially Unsafe Housing Conditions none    Primary Care Provided by self    Provides Primary Care For no one    Family Caregiver if Needed spouse    Family Caregiver Names Nanette    Quality of Family Relationships supportive    Able to Return to Prior Arrangements yes       Resource/Environmental Concerns    Resource/Environmental Concerns none    Transportation Concerns none       Transition Planning    Patient/Family Anticipates Transition to home with family    Patient/Family Anticipated Services at Transition none    Transportation Anticipated family or friend will provide       Discharge Needs Assessment    Equipment Currently Used at Home none    Concerns to be Addressed no discharge needs identified    Anticipated Changes Related to Illness none    Equipment Needed After Discharge none                   Discharge Plan       Row Name 02/11/25 0904       Plan    Plan Home    Patient/Family in Agreement with Plan yes    Plan Comments Met with pt and his wife in room. He gave permission to discuss medical information in front of his wife. Introduced self, explained , verified face sheet. Pt stated his plan is to return home with his wife at discharge. His wife will provide transportation. He does not use any medical equipment at home. He denied any need for rehab, HH, or DME at this time. CCP to follow. Samuel DEAL RN                  Continued Care and Services - Admitted Since 2/8/2025    No active coordination exists for this encounter.       Expected Discharge Date and Time       Expected Discharge Date Expected Discharge Time    Feb 12, 2025            Demographic Summary       Row Name 02/11/25  Pt called and states her trulicity was sent to [facility]. She states she had a paper from [facility] that needs to be filled out and sent in. 0900       General Information    Admission Type inpatient    Referral Source admission list       Contact Information    Permission Granted to Share Info With                    Functional Status       Row Name 02/11/25 0900       Functional Status    Usual Activity Tolerance good    Current Activity Tolerance good                   Psychosocial    No documentation.                  Abuse/Neglect    No documentation.                  Legal    No documentation.                  Substance Abuse    No documentation.                  Patient Forms    No documentation.                     Samuel Mackenzie RN

## 2025-07-18 ENCOUNTER — OFFICE VISIT (OUTPATIENT)
Dept: NEPHROLOGY | Age: 75
End: 2025-07-18
Payer: MEDICARE

## 2025-07-18 VITALS
HEART RATE: 90 BPM | DIASTOLIC BLOOD PRESSURE: 68 MMHG | BODY MASS INDEX: 36.99 KG/M2 | OXYGEN SATURATION: 96 % | HEIGHT: 62 IN | SYSTOLIC BLOOD PRESSURE: 105 MMHG | WEIGHT: 201 LBS

## 2025-07-18 DIAGNOSIS — E11.21 DIABETIC NEPHROPATHY ASSOCIATED WITH TYPE 2 DIABETES MELLITUS (HCC): ICD-10-CM

## 2025-07-18 DIAGNOSIS — N18.32 STAGE 3B CHRONIC KIDNEY DISEASE (HCC): Primary | ICD-10-CM

## 2025-07-18 DIAGNOSIS — I10 PRIMARY HYPERTENSION: ICD-10-CM

## 2025-07-18 PROCEDURE — 3017F COLORECTAL CA SCREEN DOC REV: CPT | Performed by: INTERNAL MEDICINE

## 2025-07-18 PROCEDURE — 1123F ACP DISCUSS/DSCN MKR DOCD: CPT | Performed by: INTERNAL MEDICINE

## 2025-07-18 PROCEDURE — 1036F TOBACCO NON-USER: CPT | Performed by: INTERNAL MEDICINE

## 2025-07-18 PROCEDURE — 3078F DIAST BP <80 MM HG: CPT | Performed by: INTERNAL MEDICINE

## 2025-07-18 PROCEDURE — G8427 DOCREV CUR MEDS BY ELIG CLIN: HCPCS | Performed by: INTERNAL MEDICINE

## 2025-07-18 PROCEDURE — 1159F MED LIST DOCD IN RCRD: CPT | Performed by: INTERNAL MEDICINE

## 2025-07-18 PROCEDURE — 3074F SYST BP LT 130 MM HG: CPT | Performed by: INTERNAL MEDICINE

## 2025-07-18 PROCEDURE — 99213 OFFICE O/P EST LOW 20 MIN: CPT | Performed by: INTERNAL MEDICINE

## 2025-07-18 PROCEDURE — G8400 PT W/DXA NO RESULTS DOC: HCPCS | Performed by: INTERNAL MEDICINE

## 2025-07-18 PROCEDURE — G8417 CALC BMI ABV UP PARAM F/U: HCPCS | Performed by: INTERNAL MEDICINE

## 2025-07-18 PROCEDURE — 2022F DILAT RTA XM EVC RTNOPTHY: CPT | Performed by: INTERNAL MEDICINE

## 2025-07-18 PROCEDURE — 1090F PRES/ABSN URINE INCON ASSESS: CPT | Performed by: INTERNAL MEDICINE

## 2025-07-18 PROCEDURE — 3046F HEMOGLOBIN A1C LEVEL >9.0%: CPT | Performed by: INTERNAL MEDICINE

## 2025-07-18 RX ORDER — GABAPENTIN 400 MG/1
400 CAPSULE ORAL 3 TIMES DAILY
COMMUNITY
Start: 2025-05-24

## 2025-07-18 NOTE — PROGRESS NOTES
Denies any changes.   
ALT 42 11/19/2020    ALT 17 01/08/2019    BILITOT 0.2 11/12/2021    BILITOT 0.2 11/19/2020    BILITOT 0.3 01/08/2019    ALKPHOS 67 11/12/2021    ALKPHOS 90 11/19/2020    ALKPHOS 59 01/08/2019     BNP: No results found for: \"BNP\"  Lipids:   Lab Results   Component Value Date    CHOL 190 11/12/2021    HDL 19 (A) 11/12/2021     INR: No results found for: \"INR\"  URINE: No results found for: \"NAUR\", \"PROTUR\"  No results found for: \"NITRU\", \"COLORU\", \"PHUR\", \"LABCAST\", \"WBCUA\", \"RBCUA\", \"MUCUS\", \"TRICHOMONAS\", \"YEAST\", \"BACTERIA\", \"CLARITYU\", \"SPECGRAV\", \"LEUKOCYTESUR\", \"UROBILINOGEN\", \"BILIRUBINUR\", \"BLOODU\", \"GLUCOSEU\", \"KETUA\", \"AMORPHOUS\"   Microalbumen/Creatinine ratio:  No components found for: \"RUCREAT\"    Objective:   Vitals: /68 (BP Site: Left Upper Arm, Patient Position: Sitting, BP Cuff Size: Large Adult)   Pulse 90   Ht 1.575 m (5' 2\")   Wt 91.2 kg (201 lb)   SpO2 96%   BMI 36.76 kg/m²      Constitutional:  Alert, awake, no apparent distress  Skin:normal with no rash or any significant lesions  HEENT:Pupils are reactive .Throat is clear.  Oral mucosa is moist.  Neck:supple with no thyromegaly, JVD, lymphadenopathy or bruit **  Cardiovascular: Regular sinus rhythm without murmur, rubs or gallops   Respiratory:  Clear to auscultation with no wheezes or rales  Abdomen: Good bowel sound, soft, non tender and no bruit  Ext: No LE edema  Musculoskeletal:Intact  Neuro:Alert, awake and oriented with no obvious focal deficit.  Speech is normal.**    Electronically signed by Israel Drew MD on 7/18/2025 at 1:03 PM   **This report has been created using voice recognition software. It maycontain minor  errors which are inherent in voice recognition technology.**